# Patient Record
Sex: FEMALE | Race: WHITE | NOT HISPANIC OR LATINO
[De-identification: names, ages, dates, MRNs, and addresses within clinical notes are randomized per-mention and may not be internally consistent; named-entity substitution may affect disease eponyms.]

---

## 2018-02-15 ENCOUNTER — TRANSCRIPTION ENCOUNTER (OUTPATIENT)
Age: 63
End: 2018-02-15

## 2018-12-21 PROBLEM — Z00.00 ENCOUNTER FOR PREVENTIVE HEALTH EXAMINATION: Status: ACTIVE | Noted: 2018-12-21

## 2019-01-31 ENCOUNTER — RESULT REVIEW (OUTPATIENT)
Age: 64
End: 2019-01-31

## 2019-01-31 ENCOUNTER — APPOINTMENT (OUTPATIENT)
Dept: ENDOCRINOLOGY | Facility: CLINIC | Age: 64
End: 2019-01-31
Payer: COMMERCIAL

## 2019-01-31 VITALS
DIASTOLIC BLOOD PRESSURE: 82 MMHG | BODY MASS INDEX: 18 KG/M2 | HEIGHT: 66 IN | HEART RATE: 72 BPM | WEIGHT: 112 LBS | SYSTOLIC BLOOD PRESSURE: 145 MMHG

## 2019-01-31 PROCEDURE — 99203 OFFICE O/P NEW LOW 30 MIN: CPT | Mod: 25

## 2019-01-31 PROCEDURE — 36415 COLL VENOUS BLD VENIPUNCTURE: CPT

## 2019-02-10 LAB
25(OH)D3 SERPL-MCNC: 33.6 NG/ML
ALBUMIN SERPL ELPH-MCNC: 5 G/DL
ALP BLD-CCNC: 97 U/L
ALT SERPL-CCNC: 16 U/L
ANION GAP SERPL CALC-SCNC: 14 MMOL/L
AST SERPL-CCNC: 21 U/L
BASOPHILS # BLD AUTO: 0.02 K/UL
BASOPHILS NFR BLD AUTO: 0.4 %
BILIRUB DIRECT SERPL-MCNC: 0.1 MG/DL
BILIRUB INDIRECT SERPL-MCNC: 0.4 MG/DL
BILIRUB SERPL-MCNC: 0.5 MG/DL
BUN SERPL-MCNC: 10 MG/DL
CALCIUM SERPL-MCNC: 10.2 MG/DL
CALCIUM SERPL-MCNC: 10.2 MG/DL
CHLORIDE SERPL-SCNC: 95 MMOL/L
CO2 SERPL-SCNC: 30 MMOL/L
COLLAGEN CTX SERPL-MCNC: 513 PG/ML
CREAT SERPL-MCNC: 0.68 MG/DL
EOSINOPHIL # BLD AUTO: 0.11 K/UL
EOSINOPHIL NFR BLD AUTO: 2.1 %
GLIADIN IGA SER QL: <5 UNITS
GLIADIN IGG SER QL: <5 UNITS
GLIADIN PEPTIDE IGA SER-ACNC: NEGATIVE
GLIADIN PEPTIDE IGG SER-ACNC: NEGATIVE
GLUCOSE SERPL-MCNC: 88 MG/DL
HCT VFR BLD CALC: 42.3 %
HGB BLD-MCNC: 13.9 G/DL
IMM GRANULOCYTES NFR BLD AUTO: 0.2 %
LYMPHOCYTES # BLD AUTO: 1.14 K/UL
LYMPHOCYTES NFR BLD AUTO: 21.4 %
M PROTEIN SPEC IFE-MCNC: NORMAL
MAN DIFF?: NORMAL
MCHC RBC-ENTMCNC: 29.7 PG
MCHC RBC-ENTMCNC: 32.9 GM/DL
MCV RBC AUTO: 90.4 FL
MONOCYTES # BLD AUTO: 0.56 K/UL
MONOCYTES NFR BLD AUTO: 10.5 %
NEUTROPHILS # BLD AUTO: 3.48 K/UL
NEUTROPHILS NFR BLD AUTO: 65.4 %
OSTEOCALCIN SERPL-MCNC: 29 NG/ML
PARATHYROID HORMONE INTACT: 39 PG/ML
PHOSPHATE SERPL-MCNC: 3.5 MG/DL
PLATELET # BLD AUTO: 354 K/UL
POTASSIUM SERPL-SCNC: 4.2 MMOL/L
PROT SERPL-MCNC: 8.2 G/DL
RBC # BLD: 4.68 M/UL
RBC # FLD: 13.6 %
SODIUM SERPL-SCNC: 139 MMOL/L
T4 SERPL-MCNC: 10.1 UG/DL
TSH SERPL-ACNC: 1.05 UIU/ML
TTG IGA SER IA-ACNC: <5 UNITS
TTG IGA SER-ACNC: NEGATIVE
TTG IGG SER IA-ACNC: <5 UNITS
TTG IGG SER IA-ACNC: NEGATIVE
WBC # FLD AUTO: 5.32 K/UL

## 2019-02-27 ENCOUNTER — RECORD ABSTRACTING (OUTPATIENT)
Age: 64
End: 2019-02-27

## 2019-02-27 DIAGNOSIS — Z86.39 PERSONAL HISTORY OF OTHER ENDOCRINE, NUTRITIONAL AND METABOLIC DISEASE: ICD-10-CM

## 2019-02-27 DIAGNOSIS — Z86.19 PERSONAL HISTORY OF OTHER INFECTIOUS AND PARASITIC DISEASES: ICD-10-CM

## 2019-02-27 DIAGNOSIS — Z87.19 PERSONAL HISTORY OF OTHER DISEASES OF THE DIGESTIVE SYSTEM: ICD-10-CM

## 2019-02-27 LAB — CYTOLOGY CVX/VAG DOC THIN PREP: NORMAL

## 2019-02-27 RX ORDER — OMEGA-3/DHA/EPA/FISH OIL 300-1000MG
1000 CAPSULE ORAL
Refills: 0 | Status: ACTIVE | COMMUNITY

## 2019-03-22 ENCOUNTER — APPOINTMENT (OUTPATIENT)
Dept: ENDOCRINOLOGY | Facility: CLINIC | Age: 64
End: 2019-03-22
Payer: COMMERCIAL

## 2019-03-22 VITALS
HEIGHT: 66 IN | SYSTOLIC BLOOD PRESSURE: 130 MMHG | HEART RATE: 91 BPM | WEIGHT: 109 LBS | DIASTOLIC BLOOD PRESSURE: 80 MMHG | BODY MASS INDEX: 17.52 KG/M2

## 2019-03-22 PROCEDURE — 99213 OFFICE O/P EST LOW 20 MIN: CPT

## 2019-03-23 NOTE — HISTORY OF PRESENT ILLNESS
[FreeTextEntry1] : March 22, 2019\par \par PCP:  Dr. Kana Limon\par          Gyn:  Dr. Larry Mendelowitz\par          GI: Dr. Diogo Heredia  (polyps and FHx colon cancer)\par           Dentist:  Dr. Davila  \par \par CC:  Osteoporosis with compression fracture T7\par         (GERD)\par         Smoker\par \par 64 yo - no children - retired  from [employer].  \par Recent bone density showed decline. \par "If I overdo things, I note some back pain.   If you didn't tell me about the compression fracture, I wouldn't know"\par 5 ft 6" (previously 5 ft 7')     109 lbs Max weight 160.    She loses weight after episodes of the flu.  \par She has had upper endoscopy.  \par \par Impression:  Does not tolerate oral bisphosphonate.\par Needs an anabolic.\par \par Plan:  Tymlos will be requested.   \par \par \par \par \par October 15, 2019  bone density:\par \par Bone density measurements on the [employer]ologic QDR 4500 x-ray densitometer were as follows:\par \par \par \par Lumbar spine vertebrae 0.626 gm/cm2.\par \par T-score = -3.8\par \par Z-score = -2.3\par \par \par \par Hip 0.58 in gm/cm2.\par \par T-score = -3\par \par Z-score = -1.9\par \par (Femoral neck for assessment of FRAX index, 0.488 in gm/cm2.\par T-score = -3.2\par Z-score = -1.9 )\par \par \par IMPRESSION:  Osteoporotic bone mineral density of the hip and osteoporosis of the spine placing the patient at an overall high fracture risk  \par \par COMPARISON is made to a baseline at 2009 and most recent previous of 2015\par \par In the spine there are statistically significant decreases in bone mineral density of 29.6, and 10.7% respectively.\par \par In the hip, there are statistically significant decreases in bone mineral density of 30, and 8.3% respectively.\par \par \par \par X-ray of spine on 1/31/2019 revealed "overall demineralization.  A moderate compression fracture of the T7 vertebral body is noted...."  \par \par Lab tests revealed no metabolic cause for the low bone density, including 25 OH vit D of 33.6 \par \par \par \par \par \par

## 2019-03-23 NOTE — CONSULT LETTER
[FreeTextEntry2] : PCP:  Dr. Kana Limon\par          Gyn:  Dr. Larry Mendelowitz\par          GI: Dr. Diogo Heredia  (polyps and FHx colon cancer)\par           Dentist:  Dr. Davila  \par \par CC:  Osteoporosis with compression fracture T7\par         (GERD)\par         Smoker

## 2019-04-12 ENCOUNTER — APPOINTMENT (OUTPATIENT)
Dept: INTERNAL MEDICINE | Facility: CLINIC | Age: 64
End: 2019-04-12
Payer: COMMERCIAL

## 2019-04-12 VITALS
BODY MASS INDEX: 17.68 KG/M2 | WEIGHT: 110 LBS | DIASTOLIC BLOOD PRESSURE: 86 MMHG | HEIGHT: 66 IN | SYSTOLIC BLOOD PRESSURE: 160 MMHG

## 2019-04-12 DIAGNOSIS — Z78.9 OTHER SPECIFIED HEALTH STATUS: ICD-10-CM

## 2019-04-12 PROCEDURE — 99406 BEHAV CHNG SMOKING 3-10 MIN: CPT

## 2019-04-12 PROCEDURE — 99203 OFFICE O/P NEW LOW 30 MIN: CPT | Mod: 25

## 2019-04-12 PROCEDURE — 36415 COLL VENOUS BLD VENIPUNCTURE: CPT

## 2019-04-12 NOTE — REVIEW OF SYSTEMS
[Frequency] : frequency [Negative] : Neurological [Fever] : no fever [Dysuria] : no dysuria [Incontinence] : no incontinence [Hematuria] : no hematuria [de-identified] : + rash on breast

## 2019-04-12 NOTE — PHYSICAL EXAM
[Well Nourished] : well nourished [No Acute Distress] : no acute distress [Well Developed] : well developed [Well-Appearing] : well-appearing [No Respiratory Distress] : no respiratory distress  [No Accessory Muscle Use] : no accessory muscle use [Clear to Auscultation] : lungs were clear to auscultation bilaterally [Normal Rate] : normal rate  [Regular Rhythm] : with a regular rhythm [No Edema] : there was no peripheral edema [Normal S1, S2] : normal S1 and S2 [No Murmur] : no murmur heard [Normal Gait] : normal gait [Normal Affect] : the affect was normal [No Focal Deficits] : no focal deficits [Alert and Oriented x3] : oriented to person, place, and time [Normal Insight/Judgement] : insight and judgment were intact [de-identified] : mild slightly raised rash on skin

## 2019-04-12 NOTE — COUNSELING
[Discussed Cessation Medication] : cessation medication was discussed [Discussed Risks and Advised to Quit Smoking] : Discussed risks and advised to quit smoking [Discussed Cessation Strategies] : cessation strategies were discussed [Tobacco Use Cessation Intermediate Greater Than 3 Minutes Up to 10 Minutes] : Tobacco Use Cessation Intermediate Greater Than 3 Minutes Up to 10 Minutes

## 2019-04-12 NOTE — HISTORY OF PRESENT ILLNESS
[FreeTextEntry1] : lyme testing [de-identified] : Pt here for rash that is currently on her left breast. Not itchy, not painful. She gardens a lot. This rash was on her back 2 weeks ago and was also on another location on her back. She has no fever. No specific joint pains. Pt says that she was treated for Lyme in the past about 3 years ago. She had positive serologies then and her only presently symptom was a traveling rash. She has no specific fatigue. \par She has no seen any tick on her.\par She also complaints of urinary frequency as well. no pain on urination, no flank pain, no fever. She has had urinary frequency for 6 months. She drinks a lot of water.

## 2019-04-14 NOTE — ASSESSMENT
[FreeTextEntry1] : &\par Has risk factors for osteoporosis including smoking, low BMI.  Will check for additional contributing factors and then advise pharmacologic intervention.

## 2019-04-14 NOTE — PHYSICAL EXAM
[Alert] : alert [No Acute Distress] : no acute distress [Normal Sclera/Conjunctiva] : normal sclera/conjunctiva [Well Developed] : well developed [Well Nourished] : well nourished [EOMI] : extra ocular movement intact [No Proptosis] : no proptosis [Normal Oropharynx] : the oropharynx was normal [No Thyroid Nodules] : there were no palpable thyroid nodules [Thyroid Not Enlarged] : the thyroid was not enlarged [No Respiratory Distress] : no respiratory distress [No Accessory Muscle Use] : no accessory muscle use [Clear to Auscultation] : lungs were clear to auscultation bilaterally [Normal Rate] : heart rate was normal  [Pedal Pulses Normal] : the pedal pulses are present [Normal S1, S2] : normal S1 and S2 [Regular Rhythm] : with a regular rhythm [Not Tender] : non-tender [No Edema] : there was no peripheral edema [Normal Bowel Sounds] : normal bowel sounds [Post Cervical Nodes] : posterior cervical nodes [Not Distended] : not distended [Soft] : abdomen soft [Normal] : normal and non tender [Anterior Cervical Nodes] : anterior cervical nodes [Axillary Nodes] : axillary nodes [No Stigmata of Cushings Syndrome] : no stigmata of cushings syndrome [No Spinal Tenderness] : no spinal tenderness [Spine Straight] : spine straight [No Rash] : no rash [Normal Gait] : normal gait [Normal Strength/Tone] : muscle strength and tone were normal [Acanthosis Nigricans] : no acanthosis nigricans [Normal Reflexes] : deep tendon reflexes were 2+ and symmetric [Oriented x3] : oriented to person, place, and time [No Tremors] : no tremors

## 2019-04-14 NOTE — HISTORY OF PRESENT ILLNESS
[FreeTextEntry1] : January 31, 2019\par \par PCP:    Dr. Larry Mendelowitz\par            GI:  Dr. Heredia  \par \par CC:  Osteoporosis\par         (On omeprazole for Barretts)\par         (smoker)\par \par 64 yo retired  in [place].\par \par 5 ft 6 in 112 lbs \par \par \par Impression:  Has marked osteoporosis.   Low BMI and smoking contribute.\par Will check for other causes and then negotiate addition of pharmacoligc intervention.\par

## 2019-04-15 LAB — BACTERIA UR CULT: NORMAL

## 2019-04-16 LAB — B BURGDOR DNA SPEC QL NAA+PROBE: NEGATIVE

## 2019-04-25 ENCOUNTER — RX RENEWAL (OUTPATIENT)
Age: 64
End: 2019-04-25

## 2019-05-17 ENCOUNTER — APPOINTMENT (OUTPATIENT)
Dept: ENDOCRINOLOGY | Facility: CLINIC | Age: 64
End: 2019-05-17
Payer: COMMERCIAL

## 2019-05-17 VITALS
BODY MASS INDEX: 17.68 KG/M2 | HEIGHT: 66 IN | SYSTOLIC BLOOD PRESSURE: 132 MMHG | WEIGHT: 110 LBS | DIASTOLIC BLOOD PRESSURE: 80 MMHG | HEART RATE: 80 BPM

## 2019-05-17 PROCEDURE — 99214 OFFICE O/P EST MOD 30 MIN: CPT

## 2019-05-26 ENCOUNTER — LABORATORY RESULT (OUTPATIENT)
Age: 64
End: 2019-05-26

## 2019-05-27 NOTE — HISTORY OF PRESENT ILLNESS
[FreeTextEntry1] : May 17, 2019\par \par PCP:    Dr. Larry Mendelowitz\par            GI:  Dr. Heredia  \par \par CC:  Osteoporosis\par         (On omeprazole for Barretts)\par         (smoker)\par \par 5 ft 6 in 112 lbs at home.  Would like to weigh 125-130 lbs \par She likes chocolate, coffee ice cream, flying saucers.\par Has 2 - 3 BM/day which she attributes to the magnesium in her [employer] calcium citrate w/ vit D  "It would be perfect if there\par was vitamin K in it."   \par \par Today she was instructed in use of Tymlos and she did well.\par \par Plan:  Inject real Tymlos 80 mg HS.   Call me tomorrow to let me know how it goes.\par Go for updated labs a [facility] in two weeks.\par ROV here in August.  \par \par \par January 31, 2019\par \par PCP:    Dr. Larry Mendelowitz\par            GI:  Dr. Heredia  \par \par CC:  Osteoporosis\par         (On omeprazole for Barretts)\par         (smoker)\par \par 62 yo retired  in [place].\par \par 5 ft 6 in 112 lbs \par \par \par Impression:  Has marked osteoporosis.   Low BMI and smoking contribute.\par Will check for other causes and then negotiate addition of pharmacoligc intervention.\par

## 2019-06-10 ENCOUNTER — APPOINTMENT (OUTPATIENT)
Dept: ENDOCRINOLOGY | Facility: CLINIC | Age: 64
End: 2019-06-10
Payer: COMMERCIAL

## 2019-06-10 VITALS
WEIGHT: 112 LBS | BODY MASS INDEX: 18 KG/M2 | SYSTOLIC BLOOD PRESSURE: 120 MMHG | DIASTOLIC BLOOD PRESSURE: 78 MMHG | HEIGHT: 66 IN | HEART RATE: 83 BPM

## 2019-06-10 PROCEDURE — 36415 COLL VENOUS BLD VENIPUNCTURE: CPT

## 2019-06-10 PROCEDURE — 99213 OFFICE O/P EST LOW 20 MIN: CPT | Mod: 25

## 2019-06-13 LAB
ACTH SER-ACNC: 9.1 PG/ML
ALBUMIN SERPL ELPH-MCNC: 4.4 G/DL
ALP BLD-CCNC: 91 U/L
ALT SERPL-CCNC: 11 U/L
ANION GAP SERPL CALC-SCNC: 14 MMOL/L
AST SERPL-CCNC: 19 U/L
BASOPHILS # BLD AUTO: 0.06 K/UL
BASOPHILS NFR BLD AUTO: 1 %
BILIRUB DIRECT SERPL-MCNC: 0.1 MG/DL
BILIRUB INDIRECT SERPL-MCNC: 0.1 MG/DL
BILIRUB SERPL-MCNC: 0.2 MG/DL
BUN SERPL-MCNC: 12 MG/DL
CALCIUM SERPL-MCNC: 9.9 MG/DL
CHLORIDE SERPL-SCNC: 98 MMOL/L
CK SERPL-CCNC: 117 U/L
CO2 SERPL-SCNC: 28 MMOL/L
CORTIS SERPL-MCNC: 9.1 UG/DL
CREAT SERPL-MCNC: 0.79 MG/DL
EOSINOPHIL # BLD AUTO: 0.11 K/UL
EOSINOPHIL NFR BLD AUTO: 1.8 %
ERYTHROCYTE [SEDIMENTATION RATE] IN BLOOD BY WESTERGREN METHOD: 10 MM/HR
GLUCOSE SERPL-MCNC: 111 MG/DL
HCT VFR BLD CALC: 38.3 %
HGB BLD-MCNC: 12.4 G/DL
IMM GRANULOCYTES NFR BLD AUTO: 0.2 %
LYMPHOCYTES # BLD AUTO: 1.61 K/UL
LYMPHOCYTES NFR BLD AUTO: 25.8 %
MAGNESIUM SERPL-MCNC: 1.7 MG/DL
MAN DIFF?: NORMAL
MCHC RBC-ENTMCNC: 29.7 PG
MCHC RBC-ENTMCNC: 32.4 GM/DL
MCV RBC AUTO: 91.6 FL
MONOCYTES # BLD AUTO: 0.68 K/UL
MONOCYTES NFR BLD AUTO: 10.9 %
NEUTROPHILS # BLD AUTO: 3.78 K/UL
NEUTROPHILS NFR BLD AUTO: 60.3 %
OSTEOCALCIN SERPL-MCNC: 79 NG/ML
PHOSPHATE SERPL-MCNC: 3.6 MG/DL
PLATELET # BLD AUTO: 317 K/UL
POTASSIUM SERPL-SCNC: 4.1 MMOL/L
PROT SERPL-MCNC: 7.4 G/DL
RBC # BLD: 4.18 M/UL
RBC # FLD: 13.3 %
SODIUM SERPL-SCNC: 140 MMOL/L
WBC # FLD AUTO: 6.25 K/UL

## 2019-06-14 NOTE — HISTORY OF PRESENT ILLNESS
[FreeTextEntry1] : Gillian 10, 2019\par \par PCP:    Dr. Larry Mendelowitz\par            GI:  Dr. Heredia  \par \par CC:  Osteoporosis      10/15/18  spine T -3.8, Z -2.3; TH T -3, Z -19;  FN T -3.2, Z -1.9\par                                     1/13/19 X-ray:  moderate compression fracture of T7\par         (On omeprazole for Barretts)\par         (smoker)\par \par Took Tymlos 5/17 and stopped after 2 weeks because of extreme fatigue, nausea, heart burn, pain in shoulders, elbows, wrists,\par So I advised her to stop it for now and she feels betters.   "The only thing I didn't have was the lightheadedness and the dizziness"\par \par Plan:  As she still notes bone pain, will not restart Tymlos or offer any alternative, although only Forteo is likely to have similar benefit.   \par \par \par May 17, 2019\par \par PCP:    Dr. Larry Mendelowitz\par            GI:  Dr. Heredia  \par \par CC:  Osteoporosis\par         (On omeprazole for Barretts)\par         (smoker)\par \par 5 ft 6 in 112 lbs at home.  Would like to weigh 125-130 lbs \par She likes chocolate, coffee ice cream, flying saucers.\par Has 2 - 3 BM/day which she attributes to the magnesium in her [employer] calcium citrate w/ vit D  "It would be perfect if there\par was vitamin K in it."   \par \par Today she was instructed in use of Tymlos and she did well.\par \par Plan:  Inject real Tymlos 80 mg HS.   Call me tomorrow to let me know how it goes.\par Go for updated labs a [facility] in two weeks.\par ROV here in August.  \par \par \par January 31, 2019\par \par PCP:    Dr. Larry Mendelowitz\par            GI:  Dr. Heredia  \par \par CC:  Osteoporosis\par         (On omeprazole for Barretts)\par         (smoker)\par \par 62 yo retired  in [place].\par \par 5 ft 6 in 112 lbs \par \par \par Impression:  Has marked osteoporosis.   Low BMI and smoking contribute.\par Will check for other causes and then negotiate addition of pharmacoligc intervention.\par

## 2019-06-14 NOTE — ASSESSMENT
[FreeTextEntry1] : &\par She is very anxious about use of Tymlos to treat her osteoporosis and has developed symptoms which are very distracting, including decreased spine density.

## 2019-07-16 ENCOUNTER — TRANSCRIPTION ENCOUNTER (OUTPATIENT)
Age: 64
End: 2019-07-16

## 2019-08-01 ENCOUNTER — APPOINTMENT (OUTPATIENT)
Dept: ENDOCRINOLOGY | Facility: CLINIC | Age: 64
End: 2019-08-01
Payer: COMMERCIAL

## 2019-08-01 VITALS
HEART RATE: 82 BPM | SYSTOLIC BLOOD PRESSURE: 110 MMHG | DIASTOLIC BLOOD PRESSURE: 78 MMHG | BODY MASS INDEX: 17.84 KG/M2 | HEIGHT: 66 IN | WEIGHT: 111 LBS

## 2019-08-01 PROCEDURE — 99214 OFFICE O/P EST MOD 30 MIN: CPT

## 2019-08-01 RX ORDER — ABALOPARATIDE 2000 UG/ML
3120 INJECTION, SOLUTION SUBCUTANEOUS DAILY
Qty: 1 | Refills: 11 | Status: DISCONTINUED | COMMUNITY
Start: 2019-03-22 | End: 2019-08-01

## 2019-08-01 NOTE — HISTORY OF PRESENT ILLNESS
[FreeTextEntry1] : August 1, 2019\par PCP:    Dr. Larry Mendelowitz\par            GI:  Dr. Heredia  \par \par CC:  Osteoporosis      10/15/18  spine T -3.8, Z -2.3; TH T -3, Z -19;  FN T -3.2, Z -1.9\par                                     1/13/19 X-ray:  moderate compression fracture of T7\par                                                  CTXs  513 1/2019\par         (On omeprazole for Barretts)\par         (smoker)\par \par 64 yo with severe GERD, Nance's esophagus, can't tolerate oral antiresorptive such as Fosamax, Actonel.\par She has severe osteoporosis (spine T -3.8, femoral neck T -3.2 in October 2018) and was given\par anabolic agent:  Tymlos, but did not tolerate it.  \par Next appropriate step would be Prolia as order does matter.  \par Had to stop Tymlos because of symptoms noted during previous visit.\par It took about 3 weeks for her symptoms went away.\par \par Impression:  Severe osteoporosis with compression fracture of T7.\par She did not tolerate an oral bisphosphonate.\par Prolia will be next on the agenda   \par \par \par \par \par \par Gillian 10, 2019\par \par PCP:    Dr. Larry Mendelowitz\par            GI:  Dr. Heredia  \par \par CC:  Osteoporosis      10/15/18  spine T -3.8, Z -2.3; TH T -3, Z -19;  FN T -3.2, Z -1.9\par                                     1/13/19 X-ray:  moderate compression fracture of T7\par         (On omeprazole for Barretts)\par         (smoker)\par \par Took Tymlos 5/17 and stopped after 2 weeks because of extreme fatigue, nausea, heart burn, pain in shoulders, elbows, wrists,\par So I advised her to stop it for now and she feels betters.   "The only thing I didn't have was the lightheadedness and the dizziness"\par \par Plan:  As she still notes bone pain, will not restart Tymlos or offer any alternative, although only Forteo is likely to have similar benefit.   \par \par \par May 17, 2019\par \par PCP:    Dr. Larry Mendelowitz\par            GI:  Dr. Heredia  \par \par CC:  Osteoporosis\par         (On omeprazole for Barretts)\par         (smoker)\par \par 5 ft 6 in 112 lbs at home.  Would like to weigh 125-130 lbs \par She likes chocolate, coffee ice cream, flying saucers.\par Has 2 - 3 BM/day which she attributes to the magnesium in her [employer] calcium citrate w/ vit D  "It would be perfect if there\par was vitamin K in it."   \par \par Today she was instructed in use of Tymlos and she did well.\par \par Plan:  Inject real Tymlos 80 mg HS.   Call me tomorrow to let me know how it goes.\par Go for updated labs a [facility] in two weeks.\par ROV here in August.  \par \par \par January 31, 2019\par \par PCP:    Dr. Larry Mendelowitz\par            GI:  Dr. Heredia  \par \par CC:  Osteoporosis\par         (On omeprazole for Barretts)\par         (smoker)\par \par 64 yo retired  in [place].\par \par 5 ft 6 in 112 lbs \par \par \par Impression:  Has marked osteoporosis.   Low BMI and smoking contribute.\par Will check for other causes and then negotiate addition of pharmacoligc intervention.\par

## 2019-08-01 NOTE — PHYSICAL EXAM
[Alert] : alert [No Acute Distress] : no acute distress [Well Nourished] : well nourished [Well Developed] : well developed [Thyroid Not Enlarged] : the thyroid was not enlarged [No Stigmata of Cushings Syndrome] : no stigmata of cushings syndrome [Oriented x3] : oriented to person, place, and time [Normal Insight/Judgement] : insight and judgment were intact [Normal Affect] : the affect was normal [Normal Mood] : the mood was normal

## 2019-08-08 ENCOUNTER — RX CHANGE (OUTPATIENT)
Age: 64
End: 2019-08-08

## 2019-08-28 ENCOUNTER — CHART COPY (OUTPATIENT)
Age: 64
End: 2019-08-28

## 2019-09-17 ENCOUNTER — APPOINTMENT (OUTPATIENT)
Dept: OBGYN | Facility: CLINIC | Age: 64
End: 2019-09-17
Payer: COMMERCIAL

## 2019-09-17 VITALS
WEIGHT: 114 LBS | HEIGHT: 66 IN | DIASTOLIC BLOOD PRESSURE: 84 MMHG | BODY MASS INDEX: 18.32 KG/M2 | SYSTOLIC BLOOD PRESSURE: 120 MMHG

## 2019-09-17 DIAGNOSIS — Z80.0 FAMILY HISTORY OF MALIGNANT NEOPLASM OF DIGESTIVE ORGANS: ICD-10-CM

## 2019-09-17 PROCEDURE — 99396 PREV VISIT EST AGE 40-64: CPT

## 2019-09-17 NOTE — PHYSICAL EXAM
[Awake] : awake [Alert] : alert [Soft] : soft [Oriented x3] : oriented to person, place, and time [Vulvar Atrophy] : vulvar atrophy [Normal] : uterus [Atrophy] : atrophy [No Bleeding] : there was no active vaginal bleeding [Uterine Adnexae] : were not tender and not enlarged [Nl Sphincter Tone] : normal sphincter tone [Acute Distress] : no acute distress [Mass] : no breast mass [Thyroid Nodule] : no thyroid nodule [Nipple Discharge] : no nipple discharge [Axillary LAD] : no axillary lymphadenopathy [Tender] : non tender [Enlarged ___ wks] : not enlarged [Ovarian Mass (___ Cm)] : there were no adnexal masses [FreeTextEntry4] : <2 FB introitus [FreeTextEntry7] : axial [FreeTextEntry9] : no masses

## 2019-10-16 ENCOUNTER — RESULT REVIEW (OUTPATIENT)
Age: 64
End: 2019-10-16

## 2019-10-30 ENCOUNTER — RESULT REVIEW (OUTPATIENT)
Age: 64
End: 2019-10-30

## 2019-11-13 ENCOUNTER — APPOINTMENT (OUTPATIENT)
Dept: ENDOCRINOLOGY | Facility: CLINIC | Age: 64
End: 2019-11-13
Payer: COMMERCIAL

## 2019-11-13 VITALS
BODY MASS INDEX: 17.68 KG/M2 | DIASTOLIC BLOOD PRESSURE: 90 MMHG | SYSTOLIC BLOOD PRESSURE: 120 MMHG | HEIGHT: 66 IN | HEART RATE: 68 BPM | WEIGHT: 110 LBS

## 2019-11-13 PROCEDURE — 36415 COLL VENOUS BLD VENIPUNCTURE: CPT

## 2019-11-13 PROCEDURE — 99214 OFFICE O/P EST MOD 30 MIN: CPT | Mod: 25

## 2019-11-13 NOTE — ASSESSMENT
[FreeTextEntry1] : &\mohinder Still smokes about 11 cigarettes a day.\par She is working on cutting back and understands effect of smoking on bone density.\mohinder Did not tolerate Tymlos but Prolia #1 on September 11, 2019 went well.  \par Will check vitamin D level today\par She will discuss issues of libido with gynecologist.

## 2019-11-13 NOTE — HISTORY OF PRESENT ILLNESS
[FreeTextEntry1] : Nov 13, 2019\par \par PCP:    Dr. Kana Limon\par            Dr. Larry Mendelowitz\par            GI:  Dr. Heredia  \par \par CC:  Osteoporosis      10/15/18  spine T -3.8, Z -2.3; TH T -3, Z -19;  FN T -3.2, Z -1.9\par                                     1/13/19 X-ray: \par                                     Compression fracture after fall off of a ladder about 10 years ago. \par           Hx ? Lyme\par           Fatigue\par           Decreased libido\par \par 62 yo , retired , with moderate compression fracture of T7\par                                                  CTXs  513 1/2019\par                                                  9/11/19  Prolia #1\par         (On omeprazole for Barretts)\par         (smoker)   down to 11 cigs/d    orig 30 cigs/d\par \par \par Frontal and lateral projections of thoracic spine demonstrate satisfactory alignment of the bony structures. There is overall demineralization. A moderate compression fracture of the T7 vertebral body is noted. No other compression deformity is noted. There is no evidence of pedicle destruction.\par \par \par IMPRESSION: Moderate compression fracture of T7.\par \par \par ***Electronically Signed ***\par -----------------------------------------------\par Gray Coko MD              01/31/19 \par \par \par Her  was diagnosed with prostate cancer about 6 years ago and is on treatment.  \par She wishes to have her testosterone level checked. \par \par Impression:  Has osteoporosis of spine, presumably traumatic compression fracture from\par fall off of ladder, risk factors of smoking, medications, did not tolerate Tymlos, might be a\par candidate for Evenity in the future; however, tolerating Prolia at the present time, received first Prolia September 11, 2019 and tolerated it well.   Next Prolia on or after March 11, 2020 so she will return here shortly before that.   \par \par August 1, 2019\par PCP:    Dr. Larry Mendelowitz\par            GI:  Dr. Heredia  \par \par CC:  Osteoporosis      10/15/18  spine T -3.8, Z -2.3; TH T -3, Z -19;  FN T -3.2, Z -1.9\par                                     1/13/19 X-ray:  moderate compression fracture of T7\par                                                  CTXs  513 1/2019\par         (On omeprazole for Barretts)\par         (smoker)\par \par 62 yo with severe GERD, Nance's esophagus, can't tolerate oral antiresorptive such as Fosamax, Actonel.\par She has severe osteoporosis (spine T -3.8, femoral neck T -3.2 in October 2018) and was given\par anabolic agent:  Tymlos, but did not tolerate it.  \par Next appropriate step would be Prolia as order does matter.  \par Had to stop Tymlos because of symptoms noted during previous visit.\par It took about 3 weeks for her symptoms went away.\par \par Impression:  Severe osteoporosis with compression fracture of T7.\par She did not tolerate an oral bisphosphonate.\par Prolia will be next on the agenda   \par \par \par \par \par \par Gillian 10, 2019\par \par PCP:    Dr. Larry Mendelowitz\par            GI:  Dr. Heredia  \par \par CC:  Osteoporosis      10/15/18  spine T -3.8, Z -2.3; TH T -3, Z -19;  FN T -3.2, Z -1.9\par                                     1/13/19 X-ray:  moderate compression fracture of T7\par         (On omeprazole for Barretts)\par         (smoker)\par \par Took Tymlos 5/17 and stopped after 2 weeks because of extreme fatigue, nausea, heart burn, pain in shoulders, elbows, wrists,\par So I advised her to stop it for now and she feels betters.   "The only thing I didn't have was the lightheadedness and the dizziness"\par \par Plan:  As she still notes bone pain, will not restart Tymlos or offer any alternative, although only Forteo is likely to have similar benefit.   \par \par \par May 17, 2019\par \par PCP:    Dr. Larry Mendelowitz\par            GI:  Dr. Heredia  \par \par CC:  Osteoporosis\par         (On omeprazole for Barretts)\par         (smoker)\par \par 5 ft 6 in 112 lbs at home.  Would like to weigh 125-130 lbs \par She likes chocolate, coffee ice cream, flying saucers.\par Has 2 - 3 BM/day which she attributes to the magnesium in her [employer] calcium citrate w/ vit D  "It would be perfect if there\par was vitamin K in it."   \par \par Today she was instructed in use of Tymlos and she did well.\par \par Plan:  Inject real Tymlos 80 mg HS.   Call me tomorrow to let me know how it goes.\par Go for updated labs a [facility] in two weeks.\par ROV here in August.  \par \par \par January 31, 2019\par \par PCP:    Dr. Larry Mendelowitz\par            GI:  Dr. Heredia  \par \par CC:  Osteoporosis\par         (On omeprazole for Barretts)\par         (smoker)\par \par 62 yo retired  in [place].\par \par 5 ft 6 in 112 lbs \par \par \par Impression:  Has marked osteoporosis.   Low BMI and smoking contribute.\par Will check for other causes and then negotiate addition of pharmacoligc intervention.\par

## 2019-11-13 NOTE — PHYSICAL EXAM
[Alert] : alert [No Acute Distress] : no acute distress [Well Nourished] : well nourished [Well Developed] : well developed [Normal Sclera/Conjunctiva] : normal sclera/conjunctiva [EOMI] : extra ocular movement intact [No Proptosis] : no proptosis [Normal Oropharynx] : the oropharynx was normal [Thyroid Not Enlarged] : the thyroid was not enlarged [No Respiratory Distress] : no respiratory distress [No Thyroid Nodules] : there were no palpable thyroid nodules [Clear to Auscultation] : lungs were clear to auscultation bilaterally [No Accessory Muscle Use] : no accessory muscle use [Normal S1, S2] : normal S1 and S2 [Normal Rate] : heart rate was normal  [Pedal Pulses Normal] : the pedal pulses are present [Regular Rhythm] : with a regular rhythm [No Edema] : there was no peripheral edema [Not Tender] : non-tender [Normal Bowel Sounds] : normal bowel sounds [Not Distended] : not distended [Soft] : abdomen soft [Axillary Nodes] : axillary nodes [Post Cervical Nodes] : posterior cervical nodes [Anterior Cervical Nodes] : anterior cervical nodes [Normal] : normal and non tender [No Spinal Tenderness] : no spinal tenderness [Spine Straight] : spine straight [Normal Gait] : normal gait [No Stigmata of Cushings Syndrome] : no stigmata of cushings syndrome [Normal Strength/Tone] : muscle strength and tone were normal [No Rash] : no rash [Acanthosis Nigricans] : no acanthosis nigricans [Oriented x3] : oriented to person, place, and time [No Tremors] : no tremors [Normal Reflexes] : deep tendon reflexes were 2+ and symmetric

## 2019-11-14 LAB
25(OH)D3 SERPL-MCNC: 38.9 NG/ML
ALBUMIN SERPL ELPH-MCNC: 4.7 G/DL
ALP BLD-CCNC: 74 U/L
ALT SERPL-CCNC: 10 U/L
ANION GAP SERPL CALC-SCNC: 13 MMOL/L
AST SERPL-CCNC: 18 U/L
BASOPHILS # BLD AUTO: 0.05 K/UL
BASOPHILS NFR BLD AUTO: 0.8 %
BILIRUB DIRECT SERPL-MCNC: 0.1 MG/DL
BILIRUB INDIRECT SERPL-MCNC: 0.1 MG/DL
BILIRUB SERPL-MCNC: 0.2 MG/DL
BUN SERPL-MCNC: 12 MG/DL
CALCIUM SERPL-MCNC: 10.1 MG/DL
CHLORIDE SERPL-SCNC: 95 MMOL/L
CO2 SERPL-SCNC: 28 MMOL/L
CORTIS SERPL-MCNC: 10.5 UG/DL
CREAT SERPL-MCNC: 0.77 MG/DL
EOSINOPHIL # BLD AUTO: 0.1 K/UL
EOSINOPHIL NFR BLD AUTO: 1.7 %
ERYTHROCYTE [SEDIMENTATION RATE] IN BLOOD BY WESTERGREN METHOD: 25 MM/HR
ESTIMATED AVERAGE GLUCOSE: 114 MG/DL
GLUCOSE SERPL-MCNC: 89 MG/DL
HBA1C MFR BLD HPLC: 5.6 %
HCT VFR BLD CALC: 39.9 %
HGB BLD-MCNC: 13.2 G/DL
IMM GRANULOCYTES NFR BLD AUTO: 0.2 %
LYMPHOCYTES # BLD AUTO: 1.37 K/UL
LYMPHOCYTES NFR BLD AUTO: 23 %
MAN DIFF?: NORMAL
MCHC RBC-ENTMCNC: 29.8 PG
MCHC RBC-ENTMCNC: 33.1 GM/DL
MCV RBC AUTO: 90.1 FL
MONOCYTES # BLD AUTO: 0.68 K/UL
MONOCYTES NFR BLD AUTO: 11.4 %
NEUTROPHILS # BLD AUTO: 3.75 K/UL
NEUTROPHILS NFR BLD AUTO: 62.9 %
PLATELET # BLD AUTO: 314 K/UL
POTASSIUM SERPL-SCNC: 4.2 MMOL/L
PROT SERPL-MCNC: 7.9 G/DL
RBC # BLD: 4.43 M/UL
RBC # FLD: 13.2 %
SODIUM SERPL-SCNC: 136 MMOL/L
VIT B12 SERPL-MCNC: 423 PG/ML
WBC # FLD AUTO: 5.96 K/UL

## 2019-11-17 LAB
B BURGDOR AB SER-IMP: NEGATIVE
B BURGDOR IGM PATRN SER IB-IMP: NEGATIVE
B BURGDOR18KD IGG SER QL IB: NORMAL
B BURGDOR23KD IGG SER QL IB: NORMAL
B BURGDOR23KD IGM SER QL IB: NORMAL
B BURGDOR28KD IGG SER QL IB: NORMAL
B BURGDOR30KD IGG SER QL IB: NORMAL
B BURGDOR31KD IGG SER QL IB: PRESENT
B BURGDOR39KD IGG SER QL IB: NORMAL
B BURGDOR39KD IGM SER QL IB: NORMAL
B BURGDOR41KD IGG SER QL IB: PRESENT
B BURGDOR41KD IGM SER QL IB: NORMAL
B BURGDOR45KD IGG SER QL IB: PRESENT
B BURGDOR58KD IGG SER QL IB: NORMAL
B BURGDOR66KD IGG SER QL IB: NORMAL
B BURGDOR93KD IGG SER QL IB: NORMAL
VIT B6 SERPL-MCNC: 4.5 UG/L

## 2020-02-27 ENCOUNTER — APPOINTMENT (OUTPATIENT)
Dept: ENDOCRINOLOGY | Facility: CLINIC | Age: 65
End: 2020-02-27
Payer: COMMERCIAL

## 2020-02-27 VITALS
OXYGEN SATURATION: 97 % | BODY MASS INDEX: 17.84 KG/M2 | HEIGHT: 66 IN | DIASTOLIC BLOOD PRESSURE: 82 MMHG | WEIGHT: 111 LBS | SYSTOLIC BLOOD PRESSURE: 120 MMHG | HEART RATE: 72 BPM

## 2020-02-27 PROCEDURE — 99214 OFFICE O/P EST MOD 30 MIN: CPT

## 2020-03-05 NOTE — HISTORY OF PRESENT ILLNESS
[FreeTextEntry1] : Feb 27, 2020\par PCP:    Dr. Kana Limon\par            Dr. Larry Mendelowitz\par            GI:  Dr. Heredia  \par \par CC:  Osteoporosis      10/15/18  spine T -3.8, Z -2.3; TH T -3, Z -19;  FN T -3.2, Z -1.9\par                                     1/13/19 X-ray: \par                                     Compression fracture after fall off of a ladder about 10 years ago. \par           Hx ? Lyme\par           Fatigue\par           Decreased libido\par \par Received Prolia #1 on 9/11/2019 and reports that a month later she had diffuse bone pain, sandpaper rash on right palm, running nose, severe exhaustion, racing heart.   \par She does not wish to take any more Prolia and wants to know when it will be out of her system.   Still has racing heart and bone pain.   \par \par Impression:   Has symptomatic reflux and did not tolerate oral bisphosphonates.\par She did not tolerate the anabolic Tymlos and does not tolerate the Prolia either.\par She can be considered for Evenity as she has severe osteoporosis with comrpession fractures.\par \par Copay ID  RAQUEL [id number]\par \par \par \par \par \par Nov 13, 2019\par \par PCP:    Dr. Kana Limon\par            Dr. Larry Mendelowitz\par            GI:  Dr. Heredia  \par \par CC:  Osteoporosis      10/15/18  spine T -3.8, Z -2.3; TH T -3, Z -19;  FN T -3.2, Z -1.9\par                                     1/13/19 X-ray: \par                                     Compression fracture after fall off of a ladder about 10 years ago. \par           Hx ? Lyme\par           Fatigue\par           Decreased libido\par \par 62 yo , retired , with moderate compression fracture of T7\par                                                  CTXs  513 1/2019\par                                                  9/11/19  Prolia #1\par         (On omeprazole for Barretts)\par         (smoker)   down to 11 cigs/d    orig 30 cigs/d\par \par \par Frontal and lateral projections of thoracic spine demonstrate satisfactory alignment of the bony structures. There is overall demineralization. A moderate compression fracture of the T7 vertebral body is noted. No other compression deformity is noted. There is no evidence of pedicle destruction.\par \par \par IMPRESSION: Moderate compression fracture of T7.\par \par \par ***Electronically Signed ***\par -----------------------------------------------\par Gray Cook MD              01/31/19 \par \par \par Her  was diagnosed with prostate cancer about 6 years ago and is on treatment.  \par She wishes to have her testosterone level checked. \par \par Impression:  Has osteoporosis of spine, presumably traumatic compression fracture from\par fall off of ladder, risk factors of smoking, medications, did not tolerate Tymlos, might be a\par candidate for Evenity in the future; however, tolerating Prolia at the present time, received first Prolia September 11, 2019 and tolerated it well.   Next Prolia on or after March 11, 2020 so she will return here shortly before that.   \par \par August 1, 2019\par PCP:    Dr. Larry Mendelowitz\par            GI:  Dr. Heredia  \par \par CC:  Osteoporosis      10/15/18  spine T -3.8, Z -2.3; TH T -3, Z -19;  FN T -3.2, Z -1.9\par                                     1/13/19 X-ray:  moderate compression fracture of T7\par                                                  CTXs  513 1/2019\par         (On omeprazole for Barretts)\par         (smoker)\par \par 62 yo with severe GERD, Nance's esophagus, can't tolerate oral antiresorptive such as Fosamax, Actonel.\par She has severe osteoporosis (spine T -3.8, femoral neck T -3.2 in October 2018) and was given\par anabolic agent:  Tymlos, but did not tolerate it.  \par Next appropriate step would be Prolia as order does matter.  \par Had to stop Tymlos because of symptoms noted during previous visit.\par It took about 3 weeks for her symptoms went away.\par \par Impression:  Severe osteoporosis with compression fracture of T7.\par She did not tolerate an oral bisphosphonate.\par Prolia will be next on the agenda   \par \par \par \par \par \par Gillian 10, 2019\par \par PCP:    Dr. Larry Mendelowitz\par            GI:  Dr. Heredia  \par \par CC:  Osteoporosis      10/15/18  spine T -3.8, Z -2.3; TH T -3, Z -19;  FN T -3.2, Z -1.9\par                                     1/13/19 X-ray:  moderate compression fracture of T7\par         (On omeprazole for Barretts)\par         (smoker)\par \par Took Tymlos 5/17 and stopped after 2 weeks because of extreme fatigue, nausea, heart burn, pain in shoulders, elbows, wrists,\par So I advised her to stop it for now and she feels betters.   "The only thing I didn't have was the lightheadedness and the dizziness"\par \par Plan:  As she still notes bone pain, will not restart Tymlos or offer any alternative, although only Forteo is likely to have similar benefit.   \par \par \par May 17, 2019\par \par PCP:    Dr. Larry Mendelowitz\par            GI:  Dr. Heredia  \par \par CC:  Osteoporosis\par         (On omeprazole for Barretts)\par         (smoker)\par \par 5 ft 6 in 112 lbs at home.  Would like to weigh 125-130 lbs \par She likes chocolate, coffee ice cream, flying saucers.\par Has 2 - 3 BM/day which she attributes to the magnesium in her [employer] calcium citrate w/ vit D  "It would be perfect if there\par was vitamin K in it."   \par \par Today she was instructed in use of Tymlos and she did well.\par \par Plan:  Inject real Tymlos 80 mg HS.   Call me tomorrow to let me know how it goes.\par Go for updated labs a [facility] in two weeks.\par ROV here in August.  \par \par \par January 31, 2019\par \par PCP:    Dr. Larry Mendelowitz\par            GI:  Dr. Heredia  \par \par CC:  Osteoporosis\par         (On omeprazole for Barretts)\par         (smoker)\par \par 62 yo retired  in [place].\par \par 5 ft 6 in 112 lbs \par \par \par Impression:  Has marked osteoporosis.   Low BMI and smoking contribute.\par Will check for other causes and then negotiate addition of pharmacoligc intervention.\par

## 2020-03-05 NOTE — ASSESSMENT
[FreeTextEntry1] : Moderate compression fracture of T7 and spine T score -3.8\par Has not tolerated oral bisphosphonate, \par Appears she may have had reaction to Prolia.\par So she is appropriate candidate for Evenity once she receives insurance authorization.\par

## 2020-04-02 ENCOUNTER — APPOINTMENT (OUTPATIENT)
Dept: ENDOCRINOLOGY | Facility: CLINIC | Age: 65
End: 2020-04-02

## 2020-04-02 ENCOUNTER — APPOINTMENT (OUTPATIENT)
Dept: ENDOCRINOLOGY | Facility: CLINIC | Age: 65
End: 2020-04-02
Payer: COMMERCIAL

## 2020-04-02 PROCEDURE — 99443: CPT

## 2020-04-02 NOTE — HISTORY OF PRESENT ILLNESS
[FreeTextEntry1] : Apr 02, 2020\par \par This is a 21+ minute Tele-Phonic encounter with an established patient which was initiated by the patient during a time scheduled for a visit and the patient is aware that this may be a billable encounter.  The patient has not seen a provider of my specialty (Endocrinology) within out group in the past 7 days and this encounter is not anticipated to result in a scheduled in-person visit within he next 7 days.\par The reason for this Tele-Phonic encounter is listed below under "CC:"\par Verbal consent was discussed and obtained from the patient for this visit:  "You have chosen to receive care through the use of tele-media or telephone.   This enables health care providers at different locations to provide safe, effective, and convenient care through the use of technology.  Please note this is a billable encounter.  As with any health care service, there are risks associated with the use of tele-media or telephone, including equipment failure, poor image and/or resolution, and  issues.  You understand that I cannot physically examine you and that you may need to come to the office to complete the assessment.\par \par Patient agreed verbally to understanding the risks, benefits, alternatives of Tele-Media and telephone as explained.  All questions regarding tele-media and telephone encounters were answered. \par \par PCP:    Dr. Kana Limon\par            Dr. Larry Mendelowitz\par            GI:  Dr. Heredia  \par \par CC:  Osteoporosis      10/15/18  spine T -3.8, Z -2.3; TH T -3, Z -19;  FN T -3.2, Z -1.9\par                                     1/13/19 X-ray: \par                                     Compression fracture after fall off of a ladder about 10 years ago. T7\par                                      (smoker)\par           Hx ? Lyme\par           Fatigue\par           Decreased libido\par \par Did not tolerate oral bisphosphonate, Prolia. \par Had anticipated starting on Evenity; however, reluctant to leave home or to [facility]. \par She helps to care for her  who has cancer.   His PSA has risen.  \par \par She is taking calcium citrate HS w/ D     Also gets D from [facility] \par \par Impression:  Agree that it is prudent to avoid societal contacts as much as possible.\par Aim for f/u visit September and another shot at [facility].\par Avoid cigarettes.  \par \par \par Feb 27, 2020\par PCP:    Dr. Kana Limon\par            Dr. Larry Mendelowitz\par            GI:  Dr. Heredia  \par \par CC:  Osteoporosis      10/15/18  spine T -3.8, Z -2.3; TH T -3, Z -19;  FN T -3.2, Z -1.9\par                                     1/13/19 X-ray: \par                                     Compression fracture after fall off of a ladder about 10 years ago. \par           Hx ? Lyme\par           Fatigue\par           Decreased libido\par \par Received Prolia #1 on 9/11/2019 and reports that a month later she had diffuse bone pain, sandpaper rash on right palm, running nose, severe exhaustion, racing heart.   \par She does not wish to take any more Prolia and wants to know when it will be out of her system.   Still has racing heart and bone pain.   \par \par Impression:   Has symptomatic reflux and did not tolerate oral bisphosphonates.\par She did not tolerate the anabolic Tymlos and does not tolerate the Prolia either.\par She can be considered for [facility] as she has severe osteoporosis with comrpression fractures.\par \par Copay ID  RAQUEL [id number]\par \par \par \par \par \par Nov 13, 2019\par \par PCP:    Dr. Kana Limon\par            Dr. Larry Mendelowitz\par            GI:  Dr. Heredia  \par \par CC:  Osteoporosis      10/15/18  spine T -3.8, Z -2.3; TH T -3, Z -19;  FN T -3.2, Z -1.9\par                                     1/13/19 X-ray: \par                                     Compression fracture after fall off of a ladder about 10 years ago. \par           Hx ? Lyme\par           Fatigue\par           Decreased libido\par \par 64 yo , retired , with moderate compression fracture of T7\par                                                  CTXs  513 1/2019\par                                                  9/11/19  Prolia #1\par         (On omeprazole for Barretts)\par         (smoker)   down to 11 cigs/d    orig 30 cigs/d\par \par \par Frontal and lateral projections of thoracic spine demonstrate satisfactory alignment of the bony structures. There is overall demineralization. A moderate compression fracture of the T7 vertebral body is noted. No other compression deformity is noted. There is no evidence of pedicle destruction.\par \par \par IMPRESSION: Moderate compression fracture of T7.\par \par \par ***Electronically Signed ***\par -----------------------------------------------\par Gray Cook MD              01/31/19 \par \par \par Her  was diagnosed with prostate cancer about 6 years ago and is on treatment.  \par She wishes to have her testosterone level checked. \par \par Impression:  Has osteoporosis of spine, presumably traumatic compression fracture from\par fall off of ladder, risk factors of smoking, medications, did not tolerate Tymlos, might be a\par candidate for Evenity in the future; however, tolerating Prolia at the present time, received first Prolia September 11, 2019 and tolerated it well.   Next Prolia on or after March 11, 2020 so she will return here shortly before that.   \par \par August 1, 2019\par PCP:    Dr. Larry Mendelowitz\par            GI:  Dr. Heredia  \par \par CC:  Osteoporosis      10/15/18  spine T -3.8, Z -2.3; TH T -3, Z -19;  FN T -3.2, Z -1.9\par                                     1/13/19 X-ray:  moderate compression fracture of T7\par                                                  CTXs  513 1/2019\par         (On omeprazole for Barretts)\par         (smoker)\par \par 64 yo with severe GERD, Nance's esophagus, can't tolerate oral antiresorptive such as Fosamax, Actonel.\par She has severe osteoporosis (spine T -3.8, femoral neck T -3.2 in October 2018) and was given\par anabolic agent:  Tymlos, but did not tolerate it.  \par Next appropriate step would be Prolia as order does matter.  \par Had to stop Tymlos because of symptoms noted during previous visit.\par It took about 3 weeks for her symptoms went away.\par \par Impression:  Severe osteoporosis with compression fracture of T7.\par She did not tolerate an oral bisphosphonate.\par Prolia will be next on the agenda   \par \par \par \par \par \par Gillian 10, 2019\par \par PCP:    Dr. Larry Mendelowitz\par            GI:  Dr. Heredia  \par \par CC:  Osteoporosis      10/15/18  spine T -3.8, Z -2.3; TH T -3, Z -19;  FN T -3.2, Z -1.9\par                                     1/13/19 X-ray:  moderate compression fracture of T7\par         (On omeprazole for Barretts)\par         (smoker)\par \par Took Tymlos 5/17 and stopped after 2 weeks because of extreme fatigue, nausea, heart burn, pain in shoulders, elbows, wrists,\par So I advised her to stop it for now and she feels betters.   "The only thing I didn't have was the lightheadedness and the dizziness"\par \par Plan:  As she still notes bone pain, will not restart Tymlos or offer any alternative, although only Forteo is likely to have similar benefit.   \par \par \par May 17, 2019\par \par PCP:    Dr. Larry Mendelowitz\par            GI:  Dr. Heredia  \par \par CC:  Osteoporosis\par         (On omeprazole for Barretts)\par         (smoker)\par \par 5 ft 6 in 112 lbs at home.  Would like to weigh 125-130 lbs \par She likes chocolate, coffee ice cream, flying saucers.\par Has 2 - 3 BM/day which she attributes to the magnesium in her [employer] calcium citrate w/ vit D  "It would be perfect if there\par was vitamin K in it."   \par \par Today she was instructed in use of Tymlos and she did well.\par \par Plan:  Inject real Tymlos 80 mg HS.   Call me tomorrow to let me know how it goes.\par Go for updated labs a [facility] in two weeks.\par ROV here in August.  \par \par \par January 31, 2019\par \par PCP:    Dr. Larry Mendelowitz\par            GI:  Dr. Heredia  \par \par CC:  Osteoporosis\par         (On omeprazole for Barretts)\par         (smoker)\par \par 64 yo retired  in [place].\par \par 5 ft 6 in 112 lbs \par \par \par Impression:  Has marked osteoporosis.   Low BMI and smoking contribute.\par Will check for other causes and then negotiate addition of pharmacoligc intervention.\par

## 2020-04-13 ENCOUNTER — RX RENEWAL (OUTPATIENT)
Age: 65
End: 2020-04-13

## 2020-09-22 ENCOUNTER — APPOINTMENT (OUTPATIENT)
Dept: OBGYN | Facility: CLINIC | Age: 65
End: 2020-09-22
Payer: COMMERCIAL

## 2020-09-22 VITALS
WEIGHT: 107 LBS | HEIGHT: 66 IN | DIASTOLIC BLOOD PRESSURE: 78 MMHG | BODY MASS INDEX: 17.19 KG/M2 | SYSTOLIC BLOOD PRESSURE: 128 MMHG

## 2020-09-22 PROCEDURE — 99396 PREV VISIT EST AGE 40-64: CPT

## 2020-09-30 LAB
CYTOLOGY CVX/VAG DOC THIN PREP: ABNORMAL
HPV HIGH+LOW RISK DNA PNL CVX: NOT DETECTED

## 2020-11-30 ENCOUNTER — NON-APPOINTMENT (OUTPATIENT)
Age: 65
End: 2020-11-30

## 2021-02-10 ENCOUNTER — APPOINTMENT (OUTPATIENT)
Dept: INTERNAL MEDICINE | Facility: CLINIC | Age: 66
End: 2021-02-10
Payer: COMMERCIAL

## 2021-02-10 VITALS
BODY MASS INDEX: 17.84 KG/M2 | HEIGHT: 66 IN | WEIGHT: 111 LBS | TEMPERATURE: 98.2 F | SYSTOLIC BLOOD PRESSURE: 154 MMHG | DIASTOLIC BLOOD PRESSURE: 82 MMHG

## 2021-02-10 DIAGNOSIS — R21 RASH AND OTHER NONSPECIFIC SKIN ERUPTION: ICD-10-CM

## 2021-02-10 DIAGNOSIS — E53.8 DEFICIENCY OF OTHER SPECIFIED B GROUP VITAMINS: ICD-10-CM

## 2021-02-10 DIAGNOSIS — Z87.39 PERSONAL HISTORY OF OTHER DISEASES OF THE MUSCULOSKELETAL SYSTEM AND CONNECTIVE TISSUE: ICD-10-CM

## 2021-02-10 DIAGNOSIS — M54.40 LUMBAGO WITH SCIATICA, UNSPECIFIED SIDE: ICD-10-CM

## 2021-02-10 DIAGNOSIS — N64.89 OTHER SPECIFIED DISORDERS OF BREAST: ICD-10-CM

## 2021-02-10 DIAGNOSIS — Z87.898 PERSONAL HISTORY OF OTHER SPECIFIED CONDITIONS: ICD-10-CM

## 2021-02-10 DIAGNOSIS — Z13.820 ENCOUNTER FOR SCREENING FOR OSTEOPOROSIS: ICD-10-CM

## 2021-02-10 DIAGNOSIS — Z12.4 ENCOUNTER FOR SCREENING FOR MALIGNANT NEOPLASM OF CERVIX: ICD-10-CM

## 2021-02-10 DIAGNOSIS — E53.1 PYRIDOXINE DEFICIENCY: ICD-10-CM

## 2021-02-10 PROCEDURE — 36415 COLL VENOUS BLD VENIPUNCTURE: CPT

## 2021-02-10 PROCEDURE — 99072 ADDL SUPL MATRL&STAF TM PHE: CPT

## 2021-02-10 PROCEDURE — 99214 OFFICE O/P EST MOD 30 MIN: CPT | Mod: 25

## 2021-02-10 RX ORDER — DENOSUMAB 60 MG/ML
60 INJECTION SUBCUTANEOUS
Qty: 1 | Refills: 1 | Status: DISCONTINUED | COMMUNITY
Start: 2019-08-08 | End: 2021-02-10

## 2021-02-10 RX ORDER — CHLORHEXIDINE GLUCONATE 4 %
LIQUID (ML) TOPICAL
Refills: 0 | Status: DISCONTINUED | COMMUNITY
End: 2021-02-10

## 2021-02-10 NOTE — PHYSICAL EXAM
[Normal Outer Ear/Nose] : the outer ears and nose were normal in appearance [Normal TMs] : both tympanic membranes were normal [No JVD] : no jugular venous distention [Normal] : normal rate, regular rhythm, normal S1 and S2 and no murmur heard [No Edema] : there was no peripheral edema [No CVA Tenderness] : no CVA  tenderness [No Spinal Tenderness] : no spinal tenderness [Grossly Normal Strength/Tone] : grossly normal strength/tone [Coordination Grossly Intact] : coordination grossly intact [No Focal Deficits] : no focal deficits [Normal Gait] : normal gait [Normal Affect] : the affect was normal [Normal Insight/Judgement] : insight and judgment were intact

## 2021-02-10 NOTE — HISTORY OF PRESENT ILLNESS
[FreeTextEntry1] : follow-up \par hospital f/u\par First visit here in almost 2 years. [de-identified] : Pt is here today. She was admitted to [facility] from 11/27/20 until 12/3/20 and then she went to [facility] for rehab. She was admitted for a CVA. She says that she was unable to get out of bed and had not been eating well or drinking well for a couple of weeks. She tested negative for COVID on hospitalization but was positive for COVID antibodies. She still has mild numbness in her right arm but not weakness anywhere, no issues with speech, SHe is able to ambulate. When she was taken to [facility] she was not able to walk. Pt lives at home with her . She is able to do everything, all her ADL's on her own. She is able to drive.

## 2021-02-10 NOTE — HEALTH RISK ASSESSMENT
[No] : In the past 12 months have you used drugs other than those required for medical reasons? No [Any fall with injury in past year] : Patient reported fall with injury in the past year [0] : 2) Feeling down, depressed, or hopeless: Not at all (0) [Patient reported mammogram was normal] : Patient reported mammogram was normal [Patient reported PAP Smear was normal] : Patient reported PAP Smear was normal [Patient reported colonoscopy was abnormal] : Patient reported colonoscopy was abnormal [HIV test declined] : HIV test declined [Hepatitis C test declined] : Hepatitis C test declined [MammogramDate] : 10/19 [PapSmearDate] : 09/20 [ColonoscopyDate] : 08/18 [ColonoscopyComments] : due 8/21 [] : No [de-identified] : none [de-identified] : regular diet

## 2021-02-10 NOTE — REVIEW OF SYSTEMS
[Dyspnea on Exertion] : dyspnea on exertion [Negative] : Heme/Lymph [Fever] : no fever [Chills] : no chills [Fatigue] : no fatigue [Chest Pain] : no chest pain [Shortness Of Breath] : no shortness of breath [Wheezing] : no wheezing [Cough] : no cough [FreeTextEntry7] : if she does not take omeprazole , gets reflux [de-identified] : no residual weakness, just numbnes sin right hand

## 2021-02-13 LAB
25(OH)D3 SERPL-MCNC: 43.1 NG/ML
ALBUMIN SERPL ELPH-MCNC: 4.3 G/DL
ALP BLD-CCNC: 143 U/L
ALT SERPL-CCNC: 17 U/L
ANION GAP SERPL CALC-SCNC: 15 MMOL/L
AST SERPL-CCNC: 25 U/L
BASOPHILS # BLD AUTO: 0.05 K/UL
BASOPHILS NFR BLD AUTO: 0.8 %
BILIRUB SERPL-MCNC: 0.2 MG/DL
BUN SERPL-MCNC: 13 MG/DL
CALCIUM SERPL-MCNC: 10 MG/DL
CHLORIDE SERPL-SCNC: 98 MMOL/L
CO2 SERPL-SCNC: 26 MMOL/L
CREAT SERPL-MCNC: 0.83 MG/DL
EOSINOPHIL # BLD AUTO: 0.12 K/UL
EOSINOPHIL NFR BLD AUTO: 2 %
ESTIMATED AVERAGE GLUCOSE: 103 MG/DL
GLUCOSE SERPL-MCNC: 88 MG/DL
HBA1C MFR BLD HPLC: 5.2 %
HCT VFR BLD CALC: 37.8 %
HGB BLD-MCNC: 11.8 G/DL
IMM GRANULOCYTES NFR BLD AUTO: 0.2 %
LYMPHOCYTES # BLD AUTO: 1.1 K/UL
LYMPHOCYTES NFR BLD AUTO: 18.1 %
MAN DIFF?: NORMAL
MCHC RBC-ENTMCNC: 29.6 PG
MCHC RBC-ENTMCNC: 31.2 GM/DL
MCV RBC AUTO: 94.7 FL
MONOCYTES # BLD AUTO: 0.65 K/UL
MONOCYTES NFR BLD AUTO: 10.7 %
NEUTROPHILS # BLD AUTO: 4.15 K/UL
NEUTROPHILS NFR BLD AUTO: 68.2 %
PLATELET # BLD AUTO: 336 K/UL
POTASSIUM SERPL-SCNC: 4.7 MMOL/L
PROT SERPL-MCNC: 7.4 G/DL
RBC # BLD: 3.99 M/UL
RBC # FLD: 13.2 %
SODIUM SERPL-SCNC: 138 MMOL/L
TSH SERPL-ACNC: 1.24 UIU/ML
WBC # FLD AUTO: 6.08 K/UL

## 2021-02-15 LAB
CHOLEST SERPL-MCNC: 223 MG/DL
CREAT SPEC-SCNC: 19 MG/DL
HDLC SERPL-MCNC: 78 MG/DL
LDLC SERPL CALC-MCNC: 126 MG/DL
MICROALBUMIN 24H UR DL<=1MG/L-MCNC: 2.7 MG/DL
MICROALBUMIN/CREAT 24H UR-RTO: 145 MG/G
NONHDLC SERPL-MCNC: 145 MG/DL
TRIGL SERPL-MCNC: 95 MG/DL

## 2021-03-25 ENCOUNTER — RESULT REVIEW (OUTPATIENT)
Age: 66
End: 2021-03-25

## 2021-04-04 ENCOUNTER — RX RENEWAL (OUTPATIENT)
Age: 66
End: 2021-04-04

## 2021-05-17 ENCOUNTER — APPOINTMENT (OUTPATIENT)
Dept: ENDOCRINOLOGY | Facility: CLINIC | Age: 66
End: 2021-05-17

## 2021-06-15 ENCOUNTER — APPOINTMENT (OUTPATIENT)
Dept: INTERNAL MEDICINE | Facility: CLINIC | Age: 66
End: 2021-06-15

## 2021-08-02 ENCOUNTER — APPOINTMENT (OUTPATIENT)
Dept: INTERNAL MEDICINE | Facility: CLINIC | Age: 66
End: 2021-08-02
Payer: COMMERCIAL

## 2021-08-02 VITALS
TEMPERATURE: 98.4 F | BODY MASS INDEX: 16.07 KG/M2 | SYSTOLIC BLOOD PRESSURE: 160 MMHG | HEIGHT: 66 IN | WEIGHT: 100 LBS | DIASTOLIC BLOOD PRESSURE: 82 MMHG

## 2021-08-02 DIAGNOSIS — Z92.89 PERSONAL HISTORY OF OTHER MEDICAL TREATMENT: ICD-10-CM

## 2021-08-02 PROCEDURE — 99406 BEHAV CHNG SMOKING 3-10 MIN: CPT

## 2021-08-02 PROCEDURE — 36415 COLL VENOUS BLD VENIPUNCTURE: CPT

## 2021-08-02 PROCEDURE — 99214 OFFICE O/P EST MOD 30 MIN: CPT | Mod: 25

## 2021-08-02 RX ORDER — UBIDECARENONE 200 MG
200 CAPSULE ORAL
Refills: 0 | Status: ACTIVE | COMMUNITY

## 2021-08-02 RX ORDER — SAW/PYGEUM/BETA/HERB/D3/B6/ZN 30 MG-25MG
200 CAPSULE ORAL
Refills: 0 | Status: ACTIVE | COMMUNITY

## 2021-08-02 NOTE — PHYSICAL EXAM
[No Acute Distress] : no acute distress [No JVD] : no jugular venous distention [No Lymphadenopathy] : no lymphadenopathy [Supple] : supple [Normal] : normal rate, regular rhythm, normal S1 and S2 and no murmur heard [Coordination Grossly Intact] : coordination grossly intact [No Focal Deficits] : no focal deficits [Normal Gait] : normal gait [Normal Insight/Judgement] : insight and judgment were intact [de-identified] : very thin

## 2021-08-02 NOTE — REVIEW OF SYSTEMS
[Negative] : Heme/Lymph [Fever] : no fever [Suicidal] : not suicidal [Insomnia] : no insomnia [Anxiety] : no anxiety [Depression] : no depression [FreeTextEntry2] : improved fatigue [de-identified] : down due to the passing of her

## 2021-08-02 NOTE — HEALTH RISK ASSESSMENT
[] : Yes [0-4] : 0-4 [No] : In the past 12 months have you used drugs other than those required for medical reasons? No [0] : 2) Feeling down, depressed, or hopeless: Not at all (0) [Patient reported mammogram was normal] : Patient reported mammogram was normal [Patient reported PAP Smear was normal] : Patient reported PAP Smear was normal [Patient reported bone density results were abnormal] : Patient reported bone density results were abnormal [Patient reported colonoscopy was abnormal] : Patient reported colonoscopy was abnormal [HIV test declined] : HIV test declined [Hepatitis C test declined] : Hepatitis C test declined [MammogramDate] : 03/21 [PapSmearDate] : 09/20 [BoneDensityDate] : 03/21 [BoneDensityComments] : osteoporosis, only wants vit D [ColonoscopyDate] : 08/18 [ColonoscopyComments] : due 8/21 [de-identified] : none [de-identified] : regular diet

## 2021-08-02 NOTE — COUNSELING
[Risk of tobacco use and health benefits of smoking cessation discussed] : Risk of tobacco use and health benefits of smoking cessation discussed [Cessation strategies including cessation program discussed] : Cessation strategies including cessation program discussed [Use of nicotine replacement therapies and other medications discussed] : Use of nicotine replacement therapies and other medications discussed [Encouraged to pick a quit date and identify support needed to quit] : Encouraged to pick a quit date and identify support needed to quit [Willing to Quit Smoking] : Willing to quit smoking [Tobacco Use Cessation Intermediate Greater Than 3 Minutes Up to 10 Minutes] : Tobacco Use Cessation Intermediate Greater Than 3 Minutes Up to 10 Minutes [FreeTextEntry1] : 5 [FreeTextEntry2] : she wants to try the gum.

## 2021-08-02 NOTE — HISTORY OF PRESENT ILLNESS
[FreeTextEntry1] : follow-up  [de-identified] : Pt here for f/u visit. She still has numbness in the right hand. \par She lost her  in May of prostate cancer. \par Pt is taking vitamin D which has helped her energy level. \par She has seen a holistic physician Ana Luisa Morales and blood work order was given. \par Pt still against taking any antihypertensive or statin medication , despite having a stroke. She wants to do it all naturally. \par Pt was diagnosed with COVID in November and was vaccinated with [employer]&[facility] in [facility]

## 2021-08-03 LAB — 25(OH)D3 SERPL-MCNC: 58.4 NG/ML

## 2021-08-04 LAB
ALBUMIN SERPL ELPH-MCNC: 4.7 G/DL
ALP BLD-CCNC: 97 U/L
ALT SERPL-CCNC: 13 U/L
ANION GAP SERPL CALC-SCNC: 12 MMOL/L
APPEARANCE: CLEAR
AST SERPL-CCNC: 23 U/L
BASOPHILS # BLD AUTO: 0.06 K/UL
BASOPHILS NFR BLD AUTO: 0.9 %
BILIRUB SERPL-MCNC: 0.2 MG/DL
BILIRUBIN URINE: NEGATIVE
BLOOD URINE: NEGATIVE
BUN SERPL-MCNC: 12 MG/DL
CALCIUM SERPL-MCNC: 10.2 MG/DL
CHLORIDE SERPL-SCNC: 94 MMOL/L
CHOLEST SERPL-MCNC: 223 MG/DL
CO2 SERPL-SCNC: 30 MMOL/L
COLOR: NORMAL
CREAT SERPL-MCNC: 0.7 MG/DL
DEPRECATED D DIMER PPP IA-ACNC: 538 NG/ML DDU
EOSINOPHIL # BLD AUTO: 0.05 K/UL
EOSINOPHIL NFR BLD AUTO: 0.8 %
ESTIMATED AVERAGE GLUCOSE: 114 MG/DL
FERRITIN SERPL-MCNC: 79 NG/ML
FIBRINOGEN PPP COAG.DERIVED-MCNC: 632 MG/DL
FOLATE SERPL-MCNC: >20 NG/ML
GLUCOSE QUALITATIVE U: NEGATIVE
GLUCOSE SERPL-MCNC: 93 MG/DL
HBA1C MFR BLD HPLC: 5.6 %
HCT VFR BLD CALC: 39.3 %
HDLC SERPL-MCNC: 76 MG/DL
HGB BLD-MCNC: 13 G/DL
IMM GRANULOCYTES NFR BLD AUTO: 0.3 %
IRON SATN MFR SERPL: 16 %
IRON SERPL-MCNC: 52 UG/DL
KETONES URINE: NEGATIVE
LDLC SERPL CALC-MCNC: 126 MG/DL
LEUKOCYTE ESTERASE URINE: NEGATIVE
LYMPHOCYTES # BLD AUTO: 1.4 K/UL
LYMPHOCYTES NFR BLD AUTO: 21.1 %
MAN DIFF?: NORMAL
MCHC RBC-ENTMCNC: 29.5 PG
MCHC RBC-ENTMCNC: 33.1 GM/DL
MCV RBC AUTO: 89.1 FL
MONOCYTES # BLD AUTO: 0.71 K/UL
MONOCYTES NFR BLD AUTO: 10.7 %
NEUTROPHILS # BLD AUTO: 4.39 K/UL
NEUTROPHILS NFR BLD AUTO: 66.2 %
NITRITE URINE: NEGATIVE
NONHDLC SERPL-MCNC: 147 MG/DL
PH URINE: 7
PLATELET # BLD AUTO: 420 K/UL
POTASSIUM SERPL-SCNC: 4.7 MMOL/L
PROT SERPL-MCNC: 7.6 G/DL
PROTEIN URINE: NEGATIVE
RBC # BLD: 4.41 M/UL
RBC # FLD: 13.9 %
SODIUM SERPL-SCNC: 136 MMOL/L
SPECIFIC GRAVITY URINE: 1.01
TIBC SERPL-MCNC: 328 UG/DL
TRIGL SERPL-MCNC: 104 MG/DL
UIBC SERPL-MCNC: 276 UG/DL
UROBILINOGEN URINE: NORMAL
VIT B12 SERPL-MCNC: 642 PG/ML
WBC # FLD AUTO: 6.63 K/UL

## 2021-08-05 LAB
HOMOCYSTEINE LEVEL: 13 UMOL/L
METHYLMALONIC ACID LEVEL: 205 NMOL/L

## 2021-08-06 ENCOUNTER — APPOINTMENT (OUTPATIENT)
Dept: GASTROENTEROLOGY | Facility: CLINIC | Age: 66
End: 2021-08-06
Payer: COMMERCIAL

## 2021-08-06 VITALS
OXYGEN SATURATION: 96 % | WEIGHT: 100 LBS | DIASTOLIC BLOOD PRESSURE: 90 MMHG | HEART RATE: 77 BPM | SYSTOLIC BLOOD PRESSURE: 130 MMHG | BODY MASS INDEX: 16.07 KG/M2 | TEMPERATURE: 97.2 F | HEIGHT: 66 IN

## 2021-08-06 DIAGNOSIS — Z86.010 PERSONAL HISTORY OF COLONIC POLYPS: ICD-10-CM

## 2021-08-06 PROCEDURE — 99244 OFF/OP CNSLTJ NEW/EST MOD 40: CPT

## 2021-08-06 NOTE — ASSESSMENT
[FreeTextEntry1] : 1.Barretts:  EGD scheduled\par \par 2.History of  colon polyps:  Colonoscopy scheduled \par \par 3.Weight loss:  EGD  / colonoscopy. Would  consider chest CT given long history of  smoking\par \par Risks of the procedures including but not limited to bleeding / perforation / infection / anesthesia complication / missed  lesions explained to the  patient . The patient expressed understanding and a desire to proceed with the procedures.\par \par Risk of not doing procedures includes but is not limited to missed or delayed diagnosis of gastric pathology, colon cancer or other gastrointestinal pathology\par \par

## 2021-08-06 NOTE — HISTORY OF PRESENT ILLNESS
[de-identified] : TARUN CANNON  is being evaluated at the request of Dr. Limon  for an opinion re: h/o Barretts / abnormal CT scan / history of  colon polyp. Patient  continue  to smoke  despite  counselling to quit\par \par In [facility]  ED  11/2020 with confusion / elevated WBC  and  CT with gastric  antral wall thickening / diverticulosis / gallstones / nodular liver  ( spoke  with Dr. Briceño who had  not seen patient  at the time  of his  call to me.  He was called as a GI consult) .  Apparently w/u revealed  a CVA  / positive  COVID ab. No EGD performed as per patient. Discharged eventually to [facility]\par \par  EGD / colonoscopy in 11/2014 confirmed Barretts esophagus and revealed hemorrhoids /diverticulosis and a hyperplastic polyp. Repeat colonoscopy recommended in 5 years / EGD in 1 yr. \par

## 2021-08-06 NOTE — CONSULT LETTER
[Dear  ___] : Dear  [unfilled], [Consult Letter:] : I had the pleasure of evaluating your patient, [unfilled]. [Please see my note below.] : Please see my note below. [Consult Closing:] : Thank you very much for allowing me to participate in the care of this patient.  If you have any questions, please do not hesitate to contact me. [Sincerely,] : Sincerely, [FreeTextEntry3] : Diogo Heredia MD\par tel: [phone]\par fax: [phone]\par

## 2021-08-06 NOTE — PHYSICAL EXAM
[General Appearance - Alert] : alert [General Appearance - In No Acute Distress] : in no acute distress [Sclera] : the sclera and conjunctiva were normal [Outer Ear] : the ears and nose were normal in appearance [Neck Appearance] : the appearance of the neck was normal [] : no respiratory distress [Abdomen Soft] : soft [Abnormal Walk] : normal gait [Skin Color & Pigmentation] : normal skin color and pigmentation [FreeTextEntry1] : r [Oriented To Time, Place, And Person] : oriented to person, place, and time

## 2021-08-12 ENCOUNTER — NON-APPOINTMENT (OUTPATIENT)
Age: 66
End: 2021-08-12

## 2021-08-17 ENCOUNTER — NON-APPOINTMENT (OUTPATIENT)
Age: 66
End: 2021-08-17

## 2021-09-23 ENCOUNTER — RESULT REVIEW (OUTPATIENT)
Age: 66
End: 2021-09-23

## 2021-09-23 ENCOUNTER — APPOINTMENT (OUTPATIENT)
Dept: HEMATOLOGY ONCOLOGY | Facility: CLINIC | Age: 66
End: 2021-09-23
Payer: COMMERCIAL

## 2021-09-23 ENCOUNTER — APPOINTMENT (OUTPATIENT)
Dept: OBGYN | Facility: CLINIC | Age: 66
End: 2021-09-23
Payer: COMMERCIAL

## 2021-09-23 VITALS
HEART RATE: 102 BPM | OXYGEN SATURATION: 98 % | HEIGHT: 66 IN | BODY MASS INDEX: 16.04 KG/M2 | TEMPERATURE: 98 F | RESPIRATION RATE: 16 BRPM | DIASTOLIC BLOOD PRESSURE: 85 MMHG | SYSTOLIC BLOOD PRESSURE: 142 MMHG | WEIGHT: 99.8 LBS

## 2021-09-23 VITALS
HEIGHT: 66 IN | DIASTOLIC BLOOD PRESSURE: 80 MMHG | SYSTOLIC BLOOD PRESSURE: 122 MMHG | WEIGHT: 100 LBS | BODY MASS INDEX: 16.07 KG/M2

## 2021-09-23 PROCEDURE — 99205 OFFICE O/P NEW HI 60 MIN: CPT

## 2021-09-23 PROCEDURE — 99396 PREV VISIT EST AGE 40-64: CPT

## 2021-09-23 NOTE — REVIEW OF SYSTEMS
[Night Sweats] : night sweats [Fatigue] : fatigue [Recent Change In Weight] : ~T recent weight change [SOB on Exertion] : shortness of breath during exertion [Anxiety] : anxiety [Negative] : Allergic/Immunologic
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
## 2021-09-23 NOTE — HISTORY OF PRESENT ILLNESS
[de-identified] : Ms. Shiloh Ruelas is 65 year old female with thrombocytosis and CVA here for consultation\par \par Patient is current smoker with history of Nance's esophagus, osteoporosis, HTN, hypercholesterolemia, hyperlipidemia, lyme disease about 5 years ago with extreme exhaustion.  Patient reports severe exhaustion, can fall asleep at the moment if she's laying down. Exhaustion started around Oct 2020, hospitalized at [facility] with positive Covid antibodies  and was told that she had 'Covid stroke".  She's discharged home with Eliquis 5mg bid. She's scheduled for colonoscopy with Dr. Heredia this Monday 9/27/21.  \par \par 8/4/21 wbc 6.63 Hgb 13.0 hct 39.3 plts 420  (normal prior to Feb 2021)\par \par FHx: Mother with cervical cancer in his 60s,  P. aunt with colon cancer in his early 60\par SHx: Former smoker - smoked all her adulthood, quit for six month, resumed from 5- August 2021. \par \par Mammogram was in March 2021. \par She received her Carlton and Carlton shot in April 2021. \par She feels her heart race with Eliquis\par She lives alone since her  passed away in May 2021 from prostate cancer. \par

## 2021-09-24 ENCOUNTER — RESULT REVIEW (OUTPATIENT)
Age: 66
End: 2021-09-24

## 2021-09-26 ENCOUNTER — RESULT REVIEW (OUTPATIENT)
Age: 66
End: 2021-09-26

## 2021-09-27 ENCOUNTER — APPOINTMENT (OUTPATIENT)
Dept: GASTROENTEROLOGY | Facility: HOSPITAL | Age: 66
End: 2021-09-27

## 2021-09-30 ENCOUNTER — APPOINTMENT (OUTPATIENT)
Dept: FAMILY MEDICINE | Facility: CLINIC | Age: 66
End: 2021-09-30
Payer: COMMERCIAL

## 2021-09-30 ENCOUNTER — LABORATORY RESULT (OUTPATIENT)
Age: 66
End: 2021-09-30

## 2021-09-30 VITALS
SYSTOLIC BLOOD PRESSURE: 120 MMHG | OXYGEN SATURATION: 98 % | BODY MASS INDEX: 16.23 KG/M2 | WEIGHT: 101 LBS | HEIGHT: 66 IN | DIASTOLIC BLOOD PRESSURE: 70 MMHG | HEART RATE: 103 BPM

## 2021-09-30 PROCEDURE — 99213 OFFICE O/P EST LOW 20 MIN: CPT

## 2021-09-30 RX ORDER — OMEPRAZOLE 40 MG/1
40 CAPSULE, DELAYED RELEASE ORAL
Qty: 90 | Refills: 3 | Status: DISCONTINUED | COMMUNITY
Start: 2019-04-25 | End: 2021-09-30

## 2021-10-04 LAB
B BURGDOR AB SER-IMP: POSITIVE
B BURGDOR IGM PATRN SER IB-IMP: NEGATIVE
B BURGDOR18KD IGG SER QL IB: NORMAL
B BURGDOR23KD IGG SER QL IB: NORMAL
B BURGDOR23KD IGM SER QL IB: NORMAL
B BURGDOR28KD IGG SER QL IB: NORMAL
B BURGDOR30KD IGG SER QL IB: NORMAL
B BURGDOR31KD IGG SER QL IB: NORMAL
B BURGDOR39KD IGG SER QL IB: PRESENT
B BURGDOR39KD IGM SER QL IB: NORMAL
B BURGDOR41KD IGG SER QL IB: PRESENT
B BURGDOR41KD IGM SER QL IB: NORMAL
B BURGDOR45KD IGG SER QL IB: PRESENT
B BURGDOR58KD IGG SER QL IB: NORMAL
B BURGDOR66KD IGG SER QL IB: PRESENT
B BURGDOR93KD IGG SER QL IB: PRESENT

## 2021-10-04 NOTE — HEALTH RISK ASSESSMENT
[0-4] : 0-4 [No] : In the past 12 months have you used drugs other than those required for medical reasons? No [No falls in past year] : Patient reported no falls in the past year [0] : 2) Feeling down, depressed, or hopeless: Not at all (0) [] : No [de-identified] : former [YearQuit] : 08/2021 [de-identified] : none [de-identified] : normal

## 2021-10-04 NOTE — HISTORY OF PRESENT ILLNESS
[FreeTextEntry8] : Pt is a 64 y/o F with PMH of Nance's, CVA, anemia, HTN and chronic shortness of breath is here today requesting a bloodtest for lyme. Says she had lyme years ago and it felt similar. She was recently seen by Heme/Onc and had labs drawn but hasn't been informed of her results yet. She was seeing them to discuss whether she needs to continue with the Eliquis. \par She reports feeling very sad and distressed since her   in 2021. She says she started losing weight last year and has a poor appetite. Had a colonoscopy and EGD done 3 days ago and was advised to repeat the EGD in 3 years and colonoscopy in 5 years. She reports fatigue and general malaise and wants to r/o lyme. \par

## 2021-10-08 ENCOUNTER — NON-APPOINTMENT (OUTPATIENT)
Age: 66
End: 2021-10-08

## 2021-10-08 LAB
A PHAGOCYTOPH IGG TITR SER IF: NORMAL TITER
B BURGDOR AB SER QL IA: POSITIVE
B MICROTI IGG TITR SER: NORMAL TITER
E CHAFFEENSIS IGG TITR SER IF: NORMAL TITER

## 2021-10-13 ENCOUNTER — NON-APPOINTMENT (OUTPATIENT)
Age: 66
End: 2021-10-13

## 2021-10-14 ENCOUNTER — APPOINTMENT (OUTPATIENT)
Dept: HEMATOLOGY ONCOLOGY | Facility: CLINIC | Age: 66
End: 2021-10-14
Payer: COMMERCIAL

## 2021-10-14 VITALS
RESPIRATION RATE: 16 BRPM | BODY MASS INDEX: 16.29 KG/M2 | DIASTOLIC BLOOD PRESSURE: 80 MMHG | OXYGEN SATURATION: 97 % | HEART RATE: 75 BPM | SYSTOLIC BLOOD PRESSURE: 126 MMHG | WEIGHT: 101.38 LBS | TEMPERATURE: 97.1 F | HEIGHT: 66 IN

## 2021-10-14 PROCEDURE — 99214 OFFICE O/P EST MOD 30 MIN: CPT

## 2021-10-14 NOTE — HISTORY OF PRESENT ILLNESS
[de-identified] : Ms. Shiloh Ruelas is 65 year old female with thrombocytosis and CVA here for consultation\par \par Patient is current smoker with history of Nance's esophagus, osteoporosis, HTN, hypercholesterolemia, hyperlipidemia, lyme disease about 5 years ago with extreme exhaustion.  Patient reports severe exhaustion, can fall asleep at the moment if she's laying down. Exhaustion started around Oct 2020, hospitalized at [facility] with positive Covid antibodies  and was told that she had 'Covid stroke".  She's discharged home with Eliquis 5mg bid. She's scheduled for colonoscopy with Dr. Heredia this Monday 9/27/21.  \par \par 8/4/21 wbc 6.63 Hgb 13.0 hct 39.3 plts 420  (normal prior to Feb 2021)\par \par FHx: Mother with cervical cancer in his 60s,  P. aunt with colon cancer in his early 60\par SHx: Former smoker - smoked all her adulthood, quit for six month, resumed from 5- August 2021. \par \par Mammogram was in March 2021. \par She received her Carlton and Carlton shot in April 2021. \par She feels her heart race with Eliquis\par She lives alone since her  passed away in May 2021 from prostate cancer. \par  [de-identified] : She is seen today for follow up\par Her brother is visiting her and she prefers to keep the visit short\par \par She was admitted [facility] for shortness of breath. Had CT chest which showed Centrilobular emphysema moderate bilaterally. No suspicious nodules or masses. Bilateral small left greater than right pleural effusions with adjacent atelectasis. Coronary artery calcifications. T6 and T7 age-indeterminate vertebral body compression fractures. \par \par Seen by  cardiologist Dr. Chirinos - scheduled stents on 10/18/21. \par Had colonoscopy with Dr. Heredia in Sept 25, 2021\par \par Continues to be on eliquis
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
## 2021-10-18 ENCOUNTER — APPOINTMENT (OUTPATIENT)
Dept: HEART AND VASCULAR | Facility: CLINIC | Age: 66
End: 2021-10-18
Payer: COMMERCIAL

## 2021-10-18 VITALS
TEMPERATURE: 97.5 F | SYSTOLIC BLOOD PRESSURE: 126 MMHG | DIASTOLIC BLOOD PRESSURE: 75 MMHG | BODY MASS INDEX: 16.07 KG/M2 | WEIGHT: 100 LBS | OXYGEN SATURATION: 96 % | HEIGHT: 66 IN | HEART RATE: 59 BPM

## 2021-10-18 PROCEDURE — 99214 OFFICE O/P EST MOD 30 MIN: CPT

## 2021-10-18 RX ORDER — ESOMEPRAZOLE MAGNESIUM 20 MG/1
20 CAPSULE, DELAYED RELEASE ORAL DAILY
Qty: 30 | Refills: 6 | Status: DISCONTINUED | COMMUNITY
Start: 2021-09-07 | End: 2021-10-18

## 2021-10-18 RX ORDER — DOXYCYCLINE HYCLATE 100 MG/1
100 CAPSULE ORAL
Qty: 42 | Refills: 0 | Status: DISCONTINUED | COMMUNITY
Start: 2021-10-04 | End: 2021-10-18

## 2021-10-18 RX ORDER — ERGOCALCIFEROL 1.25 MG/1
1.25 MG CAPSULE, LIQUID FILLED ORAL
Refills: 0 | Status: ACTIVE | COMMUNITY

## 2021-10-18 NOTE — ASSESSMENT
[FreeTextEntry1] : 64 y/o F with chronic systolic heart failure (LVEF 25%), 3V CAD, COPD, hx of Lyme disease, thrombocytosis with recent CHF exacerabtion that left without revascularization presents to clinic. We discussed options including medical therapy vs PCI vs CABG. Pt does not wish to pursue CABG, as she believes (and I am in agreement as well) that she is very high risk due to lungs/heart. I asked her to see a CT surgeon to discuss, but pt refused. She wishes to pursue high risk CHIP-PCI after long discussion. \par -continue metoprolol for CHF\par -Eliquis for thromocytosis, need to discuss case with Hematology\par -lasix 20mg daily, euvolemic on exam\par -uptitrate GDMT\par -family discussion (will discuss case with family) and then plan for CHIP-PCI.\par

## 2021-10-18 NOTE — PHYSICAL EXAM
[Frail] : frail [Normal S1, S2] : normal S1, S2 [Murmur] : murmur [Hayley ____] : hayley [unfilled] [Wheeze ____] : wheeze [unfilled] [Soft] : abdomen soft [Normal Gait] : normal gait [No Edema] : no edema [No Rash] : no rash [Moves all extremities] : moves all extremities [No Focal Deficits] : no focal deficits [Alert and Oriented] : alert and oriented [Normal memory] : normal memory [de-identified] : soft systolic

## 2021-10-18 NOTE — REVIEW OF SYSTEMS
[Fever] : no fever [Chills] : no chills [Blurry Vision] : no blurred vision [Earache] : no earache [Sore Throat] : no sore throat [SOB] : shortness of breath [Dyspnea on exertion] : dyspnea during exertion [Chest Discomfort] : no chest discomfort [Cough] : no cough [Abdominal Pain] : no abdominal pain [Change in Appetite] : no change in appetite [Constipation] : no constipation [Dysuria] : no dysuria [Joint Pain] : no joint pain [Rash] : no rash [Dizziness] : no dizziness [Convulsions] : no convulsions [Confusion] : no confusion was observed [Easy Bleeding] : no tendency for easy bleeding

## 2021-10-18 NOTE — REASON FOR VISIT
[Cardiac Failure] : cardiac failure [Coronary Artery Disease] : coronary artery disease [FreeTextEntry1] : 66 y/o F with pmhx of Lyme disease on doxycycline, COPD, hx of COVID, thrombocytosis (on Eliquis), +smoker with recent hospitalization for COPD exacerbation/PNA and acute systolic heart failure. She [facility] and after diuresis was s/p cardiac cath showing 3V CAD.\par TTE reviewed and LVEF 25%, cath reviewed- 3V CAD, CXR reviewed- emphysema, EKG reviewed- NSR< LVH with septal infarct.\par Since discharge pt with stable NYHA III symptoms (SOB with exertion). Denies any new CP symptoms.

## 2021-10-18 NOTE — CARDIOLOGY SUMMARY
[de-identified] : 10/12/2021:\par  EKG reviewed- NSR< LVH with septal infarct. [de-identified] : 10/12/2021: TTE reviewed and LVEF 25% [de-identified] : 10/12/2021: cath reviewed- 3V CAD, Intermediate SYNTAX

## 2021-10-22 ENCOUNTER — NON-APPOINTMENT (OUTPATIENT)
Age: 66
End: 2021-10-22

## 2021-10-25 ENCOUNTER — APPOINTMENT (OUTPATIENT)
Dept: FAMILY MEDICINE | Facility: CLINIC | Age: 66
End: 2021-10-25
Payer: COMMERCIAL

## 2021-10-25 VITALS
OXYGEN SATURATION: 94 % | WEIGHT: 98 LBS | HEIGHT: 66 IN | SYSTOLIC BLOOD PRESSURE: 118 MMHG | HEART RATE: 64 BPM | TEMPERATURE: 95 F | DIASTOLIC BLOOD PRESSURE: 80 MMHG | BODY MASS INDEX: 15.75 KG/M2

## 2021-10-25 PROCEDURE — 36415 COLL VENOUS BLD VENIPUNCTURE: CPT

## 2021-10-25 PROCEDURE — 99213 OFFICE O/P EST LOW 20 MIN: CPT | Mod: 25

## 2021-10-26 NOTE — HISTORY OF PRESENT ILLNESS
[de-identified] : Pt is a 64 y/o F that presents to clinic for a f/u visit. Was diagnosed with lyme disease. would like to have labs done to see if lyme infection has resolved. She started the Doxycycline but said she had a lot of stomach problems with it and needed it switched to another antibiotic. She does not recall the name. Has been feeling better and is following up with cardiology/hem/onc. Shortness of breath has improved.

## 2021-11-02 LAB
B BURGDOR AB SER-IMP: NEGATIVE
B BURGDOR IGM PATRN SER IB-IMP: NEGATIVE
B BURGDOR18KD IGG SER QL IB: NORMAL
B BURGDOR23KD IGG SER QL IB: NORMAL
B BURGDOR23KD IGM SER QL IB: NORMAL
B BURGDOR28KD IGG SER QL IB: NORMAL
B BURGDOR30KD IGG SER QL IB: NORMAL
B BURGDOR31KD IGG SER QL IB: PRESENT
B BURGDOR39KD IGG SER QL IB: PRESENT
B BURGDOR39KD IGM SER QL IB: NORMAL
B BURGDOR41KD IGG SER QL IB: PRESENT
B BURGDOR41KD IGM SER QL IB: PRESENT
B BURGDOR45KD IGG SER QL IB: PRESENT
B BURGDOR58KD IGG SER QL IB: NORMAL
B BURGDOR66KD IGG SER QL IB: NORMAL
B BURGDOR93KD IGG SER QL IB: NORMAL

## 2021-11-05 ENCOUNTER — APPOINTMENT (OUTPATIENT)
Dept: INTERNAL MEDICINE | Facility: CLINIC | Age: 66
End: 2021-11-05
Payer: COMMERCIAL

## 2021-11-05 ENCOUNTER — NON-APPOINTMENT (OUTPATIENT)
Age: 66
End: 2021-11-05

## 2021-11-05 VITALS
TEMPERATURE: 98.4 F | DIASTOLIC BLOOD PRESSURE: 70 MMHG | WEIGHT: 100 LBS | HEART RATE: 74 BPM | SYSTOLIC BLOOD PRESSURE: 128 MMHG | HEIGHT: 66 IN | BODY MASS INDEX: 16.07 KG/M2 | OXYGEN SATURATION: 98 %

## 2021-11-05 DIAGNOSIS — R03.0 ELEVATED BLOOD-PRESSURE READING, W/OUT DIAGNOSIS OF HYPERTENSION: ICD-10-CM

## 2021-11-05 DIAGNOSIS — R73.03 PREDIABETES.: ICD-10-CM

## 2021-11-05 DIAGNOSIS — Z87.898 PERSONAL HISTORY OF OTHER SPECIFIED CONDITIONS: ICD-10-CM

## 2021-11-05 PROCEDURE — 99496 TRANSJ CARE MGMT HIGH F2F 7D: CPT

## 2021-11-06 NOTE — REVIEW OF SYSTEMS
[Dyspnea on Exertion] : dyspnea on exertion [Negative] : Heme/Lymph [Fever] : no fever [Fatigue] : no fatigue [Chest Pain] : no chest pain [Lower Ext Edema] : no lower extremity edema [Shortness Of Breath] : no shortness of breath [Wheezing] : no wheezing [Cough] : no cough

## 2021-11-06 NOTE — HISTORY OF PRESENT ILLNESS
[Post-hospitalization from ___ Hospital] : Post-hospitalization from [unfilled] Hospital [Admitted on: ___] : The patient was admitted on [unfilled] [Discharged on ___] : discharged on [unfilled] [Discharge Summary] : discharge summary [Pertinent Labs] : pertinent labs [Radiology Findings] : radiology findings [Discharge Med List] : discharge medication list [Med Reconciliation] : medication reconciliation has been completed [Patient Contacted By: ____] : and contacted by [unfilled] [FreeTextEntry2] : Pt was admitted to [facility] with feeling unwell. She had Lyme disease and  subsequently had a CT chest done which showed emphysema and also had cardiac cath which showed 3 vessel disease. She is currently awaiting treatment. She also was found to have CHF. She was started on Entresto and Carvedilol. She is seeing cardiology at [facility] ([facility], [facility], Dr LEIGH Wallace), as she went for a second opinion. Most likely will be having her treatment at [facility]. She does not want to go to [place] as her support is here in [place].\par Since d/c pt is feeling well. She does get out of breath on exertion but recovers quickly. No SOB on rest. No chest pain

## 2021-11-06 NOTE — PHYSICAL EXAM
[60022 - High Complexity requires an extensive number of possible diagnoses and/or the management options, extensive complexity of the medical data (tests, etc.) to be reviewed, and a high risk of significant complications, morbidity, and/or mortality as w] : High Complexity  [No Acute Distress] : no acute distress [No JVD] : no jugular venous distention [No Lymphadenopathy] : no lymphadenopathy [Supple] : supple [Normal] : normal rate, regular rhythm, normal S1 and S2 and no murmur heard [No Edema] : there was no peripheral edema [Coordination Grossly Intact] : coordination grossly intact [No Focal Deficits] : no focal deficits [Normal Gait] : normal gait [Alert and Oriented x3] : oriented to person, place, and time [Normal Mood] : the mood was normal [Normal Insight/Judgement] : insight and judgment were intact [de-identified] : thin

## 2021-11-06 NOTE — HEALTH RISK ASSESSMENT
[No] : In the past 12 months have you used drugs other than those required for medical reasons? No [No falls in past year] : Patient reported no falls in the past year [0] : 2) Feeling down, depressed, or hopeless: Not at all (0) [Patient reported mammogram was normal] : Patient reported mammogram was normal [Patient reported PAP Smear was normal] : Patient reported PAP Smear was normal [Patient reported bone density results were abnormal] : Patient reported bone density results were abnormal [Patient reported colonoscopy was abnormal] : Patient reported colonoscopy was abnormal [HIV test declined] : HIV test declined [Hepatitis C test declined] : Hepatitis C test declined [None] : None [Alone] : lives alone [] :  [# Of Children ___] : has [unfilled] children [Feels Safe at Home] : Feels safe at home [Fully functional (bathing, dressing, toileting, transferring, walking, feeding)] : Fully functional (bathing, dressing, toileting, transferring, walking, feeding) [Fully functional (using the telephone, shopping, preparing meals, housekeeping, doing laundry, using] : Fully functional and needs no help or supervision to perform IADLs (using the telephone, shopping, preparing meals, housekeeping, doing laundry, using transportation, managing medications and managing finances) [] : No [de-identified] : none [de-identified] : regular diet  [MammogramDate] : 03/21 [PapSmearDate] : 09/20 [BoneDensityComments] : osteoporosis,  [BoneDensityDate] : 03/21 [ColonoscopyDate] : 08/18 [ColonoscopyComments] : due 8/21 [FreeTextEntry3] : her  passsed in May 2021

## 2021-12-01 ENCOUNTER — RX RENEWAL (OUTPATIENT)
Age: 66
End: 2021-12-01

## 2021-12-08 ENCOUNTER — APPOINTMENT (OUTPATIENT)
Dept: GASTROENTEROLOGY | Facility: HOSPITAL | Age: 66
End: 2021-12-08

## 2021-12-08 ENCOUNTER — TRANSCRIPTION ENCOUNTER (OUTPATIENT)
Age: 66
End: 2021-12-08

## 2021-12-10 ENCOUNTER — NON-APPOINTMENT (OUTPATIENT)
Age: 66
End: 2021-12-10

## 2021-12-16 ENCOUNTER — RESULT REVIEW (OUTPATIENT)
Age: 66
End: 2021-12-16

## 2021-12-16 ENCOUNTER — APPOINTMENT (OUTPATIENT)
Dept: HEMATOLOGY ONCOLOGY | Facility: CLINIC | Age: 66
End: 2021-12-16
Payer: MEDICARE

## 2021-12-16 ENCOUNTER — APPOINTMENT (OUTPATIENT)
Dept: INTERNAL MEDICINE | Facility: CLINIC | Age: 66
End: 2021-12-16
Payer: MEDICARE

## 2021-12-16 VITALS
OXYGEN SATURATION: 99 % | SYSTOLIC BLOOD PRESSURE: 112 MMHG | HEART RATE: 98 BPM | BODY MASS INDEX: 16.13 KG/M2 | TEMPERATURE: 98.3 F | RESPIRATION RATE: 16 BRPM | WEIGHT: 100.38 LBS | HEIGHT: 66 IN | DIASTOLIC BLOOD PRESSURE: 77 MMHG

## 2021-12-16 VITALS
TEMPERATURE: 98.5 F | BODY MASS INDEX: 16.39 KG/M2 | SYSTOLIC BLOOD PRESSURE: 92 MMHG | WEIGHT: 102 LBS | DIASTOLIC BLOOD PRESSURE: 58 MMHG | HEIGHT: 66 IN

## 2021-12-16 PROCEDURE — 99406 BEHAV CHNG SMOKING 3-10 MIN: CPT

## 2021-12-16 PROCEDURE — 36415 COLL VENOUS BLD VENIPUNCTURE: CPT

## 2021-12-16 PROCEDURE — 99496 TRANSJ CARE MGMT HIGH F2F 7D: CPT | Mod: 25

## 2021-12-16 PROCEDURE — 99214 OFFICE O/P EST MOD 30 MIN: CPT | Mod: 25

## 2021-12-16 RX ORDER — RAMIPRIL 2.5 MG/1
2.5 CAPSULE ORAL
Refills: 0 | Status: DISCONTINUED | COMMUNITY
End: 2021-12-16

## 2021-12-16 RX ORDER — APIXABAN 5 MG/1
5 TABLET, FILM COATED ORAL
Qty: 180 | Refills: 1 | Status: DISCONTINUED | COMMUNITY
Start: 2021-08-12 | End: 2021-12-16

## 2021-12-16 RX ORDER — FUROSEMIDE 40 MG/1
40 TABLET ORAL
Refills: 0 | Status: DISCONTINUED | COMMUNITY
End: 2021-12-16

## 2021-12-16 RX ORDER — ATORVASTATIN CALCIUM 40 MG/1
40 TABLET, FILM COATED ORAL DAILY
Refills: 0 | Status: DISCONTINUED | COMMUNITY
End: 2021-12-16

## 2021-12-16 NOTE — HISTORY OF PRESENT ILLNESS
[Post-hospitalization from ___ Hospital] : Post-hospitalization from [unfilled] Hospital [Admitted on: ___] : The patient was admitted on [unfilled] [Discharged on ___] : discharged on [unfilled] [Discharge Summary] : discharge summary [Pertinent Labs] : pertinent labs [Radiology Findings] : radiology findings [Discharge Med List] : discharge medication list [Med Reconciliation] : medication reconciliation has been completed [Patient Contacted By: ____] : and contacted by [unfilled] [FreeTextEntry2] : Pt had called stating that she had a lot of blood when having a BM. She was on Eliquis and Asprin. We told her to go to the ER, and she was admitted to ICU for 2 days and got 2 units of PRBC. She felt well when discharged, but last night had cramps in the legs and hands. She is feeling weak today. She has been trying to eat and hydrating well. \par She lives at home alone. She has friends and a brother that helps her. \par \par Pt says she feels that she was doing well with her health until she was diagnosed with Lyme about 4 years ago. Her  passed this year 5/21 from prostate cancer.  She says she never tested positive for COVID but had antibodies. She was adm to the hospital in 11/20 with a  COVID??, CVA, and had some mild residual right arm weakness. Now improved. At that time she did not want to be on any meds. She was again admitted on 10/21 and had cardiac cath and was diagnosed with 3 VD. She still has not had intervention as she says she saw different cardio from [facility] and [facility] and their recommendations are different. She will however be following with CT surgeon from [facility] and has agreed to CABG.  Unfortunately she cannot be on Eliquis or Advil at present and unfortunately she has started to smoke again.

## 2021-12-16 NOTE — COUNSELING
[Yes] : Risk of tobacco use and health benefits of smoking cessation discussed: Yes [Cessation strategies including cessation program discussed] : Cessation strategies including cessation program discussed [Use of nicotine replacement therapies and other medications discussed] : Use of nicotine replacement therapies and other medications discussed [Encouraged to pick a quit date and identify support needed to quit] : Encouraged to pick a quit date and identify support needed to quit [No] : Not willing to quit smoking [FreeTextEntry2] : Given all of her medical conditions pt is aware that smoking contributes to these. Strongly advised to quit which she did, but due to stress has restarted.  [FreeTextEntry1] : 5

## 2021-12-16 NOTE — REVIEW OF SYSTEMS
[Fatigue] : fatigue [Dyspnea on Exertion] : dyspnea on exertion [Frequency] : frequency [Easy Bleeding] : easy bleeding [Fever] : no fever [Chest Pain] : no chest pain [Shortness Of Breath] : no shortness of breath [Wheezing] : no wheezing [Cough] : no cough [Muscle Weakness] : no muscle weakness [FreeTextEntry6] : no SOB at rest but on exertion [FreeTextEntry7] : no bleeding since hospitalization [FreeTextEntry8] : frequency since starting on spironolactone [FreeTextEntry9] : cramping last night [de-identified] : stressed

## 2021-12-16 NOTE — HEALTH RISK ASSESSMENT
[Current] : Current [No] : In the past 12 months have you used drugs other than those required for medical reasons? No [0] : 2) Feeling down, depressed, or hopeless: Not at all (0) [Patient reported mammogram was normal] : Patient reported mammogram was normal [Patient reported PAP Smear was normal] : Patient reported PAP Smear was normal [Patient reported colonoscopy was abnormal] : Patient reported colonoscopy was abnormal [HIV test declined] : HIV test declined [Hepatitis C test declined] : Hepatitis C test declined [Alone] : lives alone [Retired] : retired [] :  [Feels Safe at Home] : Feels safe at home [Fully functional (bathing, dressing, toileting, transferring, walking, feeding)] : Fully functional (bathing, dressing, toileting, transferring, walking, feeding) [Fully functional (using the telephone, shopping, preparing meals, housekeeping, doing laundry, using] : Fully functional and needs no help or supervision to perform IADLs (using the telephone, shopping, preparing meals, housekeeping, doing laundry, using transportation, managing medications and managing finances) [de-identified] : none [de-identified] : regular diet  [MammogramDate] : 10/19 [PapSmearDate] : 09/20 [ColonoscopyDate] : 08/18 [ColonoscopyComments] : due 8/21 [FreeTextEntry2] :

## 2021-12-16 NOTE — PHYSICAL EXAM
[Normal] : no acute distress, well nourished, well developed and well-appearing [No JVD] : no jugular venous distention [No Respiratory Distress] : no respiratory distress  [No Accessory Muscle Use] : no accessory muscle use [Clear to Auscultation] : lungs were clear to auscultation bilaterally [Normal Percussion] : the chest was normal to percussion [Normal Rate] : normal rate  [Regular Rhythm] : with a regular rhythm [Normal S1, S2] : normal S1 and S2 [No Edema] : there was no peripheral edema [Normal Affect] : the affect was normal [Normal Insight/Judgement] : insight and judgment were intact [Coordination Grossly Intact] : coordination grossly intact [No Focal Deficits] : no focal deficits [Normal Gait] : normal gait [Alert and Oriented x3] : oriented to person, place, and time [Normal Mood] : the mood was normal [59059 - High Complexity requires an extensive number of possible diagnoses and/or the management options, extensive complexity of the medical data (tests, etc.) to be reviewed, and a high risk of significant complications, morbidity, and/or mortality as w] : High Complexity  [de-identified] : I/VI systolic murmur

## 2021-12-17 NOTE — HISTORY OF PRESENT ILLNESS
[de-identified] : Ms. Shiloh Ruelas is 65 year old female with thrombocytosis and CVA here for consultation\par \par Patient is current smoker with history of Nance's esophagus, osteoporosis, HTN, hypercholesterolemia, hyperlipidemia, lyme disease about 5 years ago with extreme exhaustion.  Patient reports severe exhaustion, can fall asleep at the moment if she's laying down. Exhaustion started around Oct 2020, hospitalized at [facility] with positive Covid antibodies  and was told that she had 'Covid stroke".  She's discharged home with Eliquis 5mg bid. She's scheduled for colonoscopy with Dr. Heredia this Monday 9/27/21.  \par \par 8/4/21 wbc 6.63 Hgb 13.0 hct 39.3 plts 420  (normal prior to Feb 2021)\par \par FHx: Mother with cervical cancer in his 60s,  P. aunt with colon cancer in his early 60\par SHx: Former smoker - smoked all her adulthood, quit for six month, resumed from 5- August 2021. \par \par Mammogram was in March 2021. \par She received her Carlton and Carlton shot in April 2021. \par She feels her heart race with Eliquis\par She lives alone since her  passed away in May 2021 from prostate cancer. \par  [de-identified] : She is seen today for follow up\par \par She was recently at [facility] on 12/9/21 for GI bleed with Hgb 7.9. Received 2 units of PRBCs with EGD / colonoscopy :  Barretts / diverticulosis / hemorrhoids. Her D dimer was normalized and discharged home with instruction to hold Eliquis. \par She's seeing cardiac surgeon next Tuesday for possible bypass. \par Patient refused to be on anymore anticoagulant. \par overall doing better. \par
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
## 2021-12-30 ENCOUNTER — NON-APPOINTMENT (OUTPATIENT)
Age: 66
End: 2021-12-30

## 2022-01-05 ENCOUNTER — APPOINTMENT (OUTPATIENT)
Dept: GASTROENTEROLOGY | Facility: CLINIC | Age: 67
End: 2022-01-05
Payer: MEDICARE

## 2022-01-05 VITALS
OXYGEN SATURATION: 95 % | HEIGHT: 66 IN | DIASTOLIC BLOOD PRESSURE: 70 MMHG | WEIGHT: 97 LBS | TEMPERATURE: 97.7 F | BODY MASS INDEX: 15.59 KG/M2 | HEART RATE: 94 BPM | SYSTOLIC BLOOD PRESSURE: 124 MMHG

## 2022-01-05 PROCEDURE — 99214 OFFICE O/P EST MOD 30 MIN: CPT

## 2022-01-05 RX ORDER — METOPROLOL SUCCINATE 25 MG/1
25 TABLET, EXTENDED RELEASE ORAL
Refills: 0 | Status: DISCONTINUED | COMMUNITY
End: 2022-01-05

## 2022-01-05 RX ORDER — CARVEDILOL 6.25 MG/1
6.25 TABLET, FILM COATED ORAL
Qty: 60 | Refills: 0 | Status: DISCONTINUED | COMMUNITY
Start: 2021-10-12 | End: 2022-01-05

## 2022-01-05 RX ORDER — FUROSEMIDE 20 MG/1
20 TABLET ORAL
Qty: 30 | Refills: 0 | Status: DISCONTINUED | COMMUNITY
Start: 2021-10-12 | End: 2022-01-05

## 2022-01-05 RX ORDER — CEFUROXIME AXETIL 500 MG/1
500 TABLET ORAL
Qty: 5 | Refills: 0 | Status: DISCONTINUED | COMMUNITY
Start: 2021-10-12 | End: 2022-01-05

## 2022-01-05 RX ORDER — SACUBITRIL AND VALSARTAN 24; 26 MG/1; MG/1
24-26 TABLET, FILM COATED ORAL TWICE DAILY
Refills: 0 | Status: DISCONTINUED | COMMUNITY
End: 2022-01-05

## 2022-01-05 RX ORDER — ASPIRIN 81 MG/1
81 TABLET, CHEWABLE ORAL
Qty: 30 | Refills: 0 | Status: DISCONTINUED | COMMUNITY
Start: 2021-10-12 | End: 2022-01-05

## 2022-01-05 NOTE — PHYSICAL EXAM
[General Appearance - Alert] : alert [General Appearance - In No Acute Distress] : in no acute distress [Sclera] : the sclera and conjunctiva were normal [Outer Ear] : the ears and nose were normal in appearance [Neck Appearance] : the appearance of the neck was normal [] : no respiratory distress [Abdomen Soft] : soft [Abnormal Walk] : normal gait [Skin Color & Pigmentation] : normal skin color and pigmentation [Oriented To Time, Place, And Person] : oriented to person, place, and time [No Focal Deficits] : no focal deficits

## 2022-01-05 NOTE — HISTORY OF PRESENT ILLNESS
[de-identified] : Presents  for follow up after 12/2021 hospitalization for GI bleed. No further bleeding EGD revealed Barretts / colonoscopy revealed hemorrhoids and  diverticulosis.  [place] was self  limited diverticular  bleed  \par \par Previously evaluated  for  h/o Barretts / abnormal CT scan / history of  colon polyp. Patient  continued  to smoke  despite  counselling to quit.  Scheduled EGD  / colonoscopy 8/2021 reviewed and  was notable  for Barretts  without  dysplasia / hyperplastic  colon polyp / hemorrhoids and  diverticulosis\par \par In [facility]  ED  11/2020 with confusion / elevated WBC  and  CT with gastric  antral wall thickening / diverticulosis / gallstones / nodular liver  ( spoke  with Dr. Briceño who had  not seen patient  at the time  of his  call to me.  He was called as a GI consult) .  Apparently w/u revealed  a CVA  / positive  COVID ab. No EGD performed as per patient. [facility]\par \par  EGD / colonoscopy in 11/2014 confirmed Barretts esophagus and revealed hemorrhoids /diverticulosis and a hyperplastic polyp. Repeat colonoscopy recommended in 5 years / EGD in 1 yr. \par

## 2022-01-05 NOTE — ASSESSMENT
[FreeTextEntry1] : 1.Barretts:  smoking cessation once  again encouraged.  Patient  understands  increased risk of  esophageal cancer ( as well as progression of her coronary disease/ PPI encouraged...patient  only willing to take  TUMs/ EGD in 2  year\par \par 2.History of  colon polyps:  Colonoscopy 8/2026\par \par 3.Weight loss: Stable Would  consider chest CT given long history of  smoking and  unremarkable  EGD / colonoscopy\par \par

## 2022-03-29 ENCOUNTER — RESULT REVIEW (OUTPATIENT)
Age: 67
End: 2022-03-29

## 2022-05-18 ENCOUNTER — NON-APPOINTMENT (OUTPATIENT)
Age: 67
End: 2022-05-18

## 2022-06-02 ENCOUNTER — APPOINTMENT (OUTPATIENT)
Dept: INTERNAL MEDICINE | Facility: CLINIC | Age: 67
End: 2022-06-02
Payer: MEDICARE

## 2022-06-02 VITALS
HEIGHT: 66 IN | WEIGHT: 99 LBS | HEART RATE: 88 BPM | SYSTOLIC BLOOD PRESSURE: 136 MMHG | OXYGEN SATURATION: 96 % | BODY MASS INDEX: 15.91 KG/M2 | DIASTOLIC BLOOD PRESSURE: 82 MMHG | TEMPERATURE: 99.1 F

## 2022-06-02 DIAGNOSIS — Z86.19 PERSONAL HISTORY OF OTHER INFECTIOUS AND PARASITIC DISEASES: ICD-10-CM

## 2022-06-02 DIAGNOSIS — R79.82 ELEVATED C-REACTIVE PROTEIN (CRP): ICD-10-CM

## 2022-06-02 DIAGNOSIS — Z87.19 PERSONAL HISTORY OF OTHER DISEASES OF THE DIGESTIVE SYSTEM: ICD-10-CM

## 2022-06-02 DIAGNOSIS — Z86.79 PERSONAL HISTORY OF OTHER DISEASES OF THE CIRCULATORY SYSTEM: ICD-10-CM

## 2022-06-02 DIAGNOSIS — R79.89 OTHER SPECIFIED ABNORMAL FINDINGS OF BLOOD CHEMISTRY: ICD-10-CM

## 2022-06-02 DIAGNOSIS — R25.2 CRAMP AND SPASM: ICD-10-CM

## 2022-06-02 DIAGNOSIS — K22.70 BARRETT'S ESOPHAGUS W/OUT DYSPLASIA: ICD-10-CM

## 2022-06-02 DIAGNOSIS — Z12.2 ENCOUNTER FOR SCREENING FOR MALIGNANT NEOPLASM OF RESPIRATORY ORGANS: ICD-10-CM

## 2022-06-02 DIAGNOSIS — R70.0 ELEVATED ERYTHROCYTE SEDIMENTATION RATE: ICD-10-CM

## 2022-06-02 DIAGNOSIS — Z92.89 PERSONAL HISTORY OF OTHER MEDICAL TREATMENT: ICD-10-CM

## 2022-06-02 PROCEDURE — 99215 OFFICE O/P EST HI 40 MIN: CPT

## 2022-06-02 RX ORDER — NITROGLYCERIN 0.4 MG/1
0.4 TABLET SUBLINGUAL
Refills: 0 | Status: ACTIVE | COMMUNITY

## 2022-06-02 RX ORDER — SPIRONOLACTONE 25 MG/1
25 TABLET ORAL
Qty: 90 | Refills: 1 | Status: DISCONTINUED | COMMUNITY
End: 2022-06-02

## 2022-06-02 NOTE — HEALTH RISK ASSESSMENT
[Patient reported mammogram was normal] : Patient reported mammogram was normal [Patient reported PAP Smear was normal] : Patient reported PAP Smear was normal [Patient reported bone density results were normal] : Patient reported bone density results were normal [Patient reported colonoscopy was abnormal] : Patient reported colonoscopy was abnormal [HIV test declined] : HIV test declined [Hepatitis C test declined] : Hepatitis C test declined [Alone] : lives alone [Retired] : retired [] :  [# Of Children ___] : has [unfilled] children [Feels Safe at Home] : Feels safe at home [Fully functional (bathing, dressing, toileting, transferring, walking, feeding)] : Fully functional (bathing, dressing, toileting, transferring, walking, feeding) [Fully functional (using the telephone, shopping, preparing meals, housekeeping, doing laundry, using] : Fully functional and needs no help or supervision to perform IADLs (using the telephone, shopping, preparing meals, housekeeping, doing laundry, using transportation, managing medications and managing finances) [Never] : Never [No] : No [No falls in past year] : Patient reported no falls in the past year [0] : 2) Feeling down, depressed, or hopeless: Not at all (0) [PHQ-2 Negative - No further assessment needed] : PHQ-2 Negative - No further assessment needed [[id number]] : 0 [MammogramDate] : 03/22 [PapSmearDate] : 09/20 [BoneDensityDate] : 03/21 [ColonoscopyDate] : 12/21 [ColonoscopyComments] : due in 2026 [FreeTextEntry2] :

## 2022-06-02 NOTE — HISTORY OF PRESENT ILLNESS
[FreeTextEntry1] : follow up visit [de-identified] : Pt last visit here was 1/2021. She was in the [facility] in [place]. She was admitted from 5/13-5/14 and had stents placed in her heart. She had a total of 4 stents placed. She is now compliant with statin, plavix, metoprolol and aspirin. She will be stopping aspirin on 6/13/22.\par Pt says that she feels the same after the stents. Her issue has been SOB, never chest pain.\par She says that metoprolol does not agree with her. She take 12.5mg of it and it makes her feel a way that she cannot describe. \par Pt has had COVId illness and had the J&J vaccine. no booster. \par Pt says she was in perfectly good health until she had COVID and then a stroke after COVID. She also ha LYyme at that time. She was no meds before that, only vitamins.\par She has been on symbicort since COVID and it helps to open her airway

## 2022-06-02 NOTE — REVIEW OF SYSTEMS
[Shortness Of Breath] : shortness of breath [Dyspnea on Exertion] : dyspnea on exertion [Negative] : Heme/Lymph [Fever] : no fever [Fatigue] : no fatigue [Chest Pain] : no chest pain [Wheezing] : no wheezing [Cough] : no cough

## 2022-06-02 NOTE — PHYSICAL EXAM
[Normal] : no acute distress, well nourished, well developed and well-appearing [No JVD] : no jugular venous distention [Supple] : supple [No Respiratory Distress] : no respiratory distress  [No Accessory Muscle Use] : no accessory muscle use [Clear to Auscultation] : lungs were clear to auscultation bilaterally [Normal Percussion] : the chest was normal to percussion [Normal Rate] : normal rate  [Regular Rhythm] : with a regular rhythm [Normal S1, S2] : normal S1 and S2 [No Edema] : there was no peripheral edema [Coordination Grossly Intact] : coordination grossly intact [No Focal Deficits] : no focal deficits [Normal Gait] : normal gait [Speech Grossly Normal] : speech grossly normal [Normal Affect] : the affect was normal [Alert and Oriented x3] : oriented to person, place, and time [Normal Mood] : the mood was normal [Normal Insight/Judgement] : insight and judgment were intact [de-identified] : I/VI systolic murmur

## 2022-06-15 ENCOUNTER — HOSPITAL ENCOUNTER (OUTPATIENT)
Dept: HOSPITAL 74 - FASU | Age: 67
Discharge: HOME | End: 2022-06-15
Attending: OPHTHALMOLOGY
Payer: COMMERCIAL

## 2022-06-15 VITALS — DIASTOLIC BLOOD PRESSURE: 93 MMHG | SYSTOLIC BLOOD PRESSURE: 130 MMHG | HEART RATE: 80 BPM

## 2022-06-15 VITALS — BODY MASS INDEX: 16.6 KG/M2

## 2022-06-15 VITALS — TEMPERATURE: 97.6 F

## 2022-06-15 DIAGNOSIS — H26.8: Primary | ICD-10-CM

## 2022-06-15 PROCEDURE — 08RJ3JZ REPLACEMENT OF RIGHT LENS WITH SYNTHETIC SUBSTITUTE, PERCUTANEOUS APPROACH: ICD-10-PCS | Performed by: OPHTHALMOLOGY

## 2022-06-15 PROCEDURE — 66984 XCAPSL CTRC RMVL W/O ECP: CPT

## 2022-06-15 RX ADMIN — CIPROFLOXACIN HYDROCHLORIDE ONE DROP: 3 SOLUTION/ DROPS OPHTHALMIC at 08:40

## 2022-06-15 RX ADMIN — CIPROFLOXACIN HYDROCHLORIDE ONE DROP: 3 SOLUTION/ DROPS OPHTHALMIC at 08:35

## 2022-06-15 RX ADMIN — CYCLOPENTOLATE HYDROCHLORIDE ONE DROP: 20 SOLUTION/ DROPS OPHTHALMIC at 08:40

## 2022-06-15 RX ADMIN — CYCLOPENTOLATE HYDROCHLORIDE ONE DROP: 20 SOLUTION/ DROPS OPHTHALMIC at 08:45

## 2022-06-15 RX ADMIN — TROPICAMIDE ONE DROP: 10 SOLUTION/ DROPS OPHTHALMIC at 08:45

## 2022-06-15 RX ADMIN — TROPICAMIDE ONE DROP: 10 SOLUTION/ DROPS OPHTHALMIC at 08:40

## 2022-06-15 RX ADMIN — TROPICAMIDE ONE DROP: 10 SOLUTION/ DROPS OPHTHALMIC at 08:35

## 2022-06-15 RX ADMIN — PHENYLEPHRINE HYDROCHLORIDE ONE DROP: 0.25 SPRAY NASAL at 08:35

## 2022-06-15 RX ADMIN — CYCLOPENTOLATE HYDROCHLORIDE ONE DROP: 20 SOLUTION/ DROPS OPHTHALMIC at 08:35

## 2022-06-15 RX ADMIN — PHENYLEPHRINE HYDROCHLORIDE ONE DROP: 0.25 SPRAY NASAL at 08:45

## 2022-06-15 RX ADMIN — PHENYLEPHRINE HYDROCHLORIDE ONE DROP: 0.25 SPRAY NASAL at 08:40

## 2022-06-15 RX ADMIN — CIPROFLOXACIN HYDROCHLORIDE ONE DROP: 3 SOLUTION/ DROPS OPHTHALMIC at 08:45

## 2022-09-06 ENCOUNTER — RX RENEWAL (OUTPATIENT)
Age: 67
End: 2022-09-06

## 2022-09-27 ENCOUNTER — APPOINTMENT (OUTPATIENT)
Dept: OBGYN | Facility: CLINIC | Age: 67
End: 2022-09-27

## 2022-09-27 ENCOUNTER — NON-APPOINTMENT (OUTPATIENT)
Age: 67
End: 2022-09-27

## 2022-09-27 VITALS
WEIGHT: 102 LBS | HEIGHT: 66 IN | DIASTOLIC BLOOD PRESSURE: 90 MMHG | SYSTOLIC BLOOD PRESSURE: 150 MMHG | BODY MASS INDEX: 16.39 KG/M2

## 2022-09-27 DIAGNOSIS — F17.200 NICOTINE DEPENDENCE, UNSPECIFIED, UNCOMPLICATED: ICD-10-CM

## 2022-09-27 PROCEDURE — G0101: CPT

## 2022-10-03 LAB
CYTOLOGY CVX/VAG DOC THIN PREP: ABNORMAL
HPV HIGH+LOW RISK DNA PNL CVX: NOT DETECTED

## 2022-10-05 ENCOUNTER — APPOINTMENT (OUTPATIENT)
Dept: INTERNAL MEDICINE | Facility: CLINIC | Age: 67
End: 2022-10-05

## 2022-10-05 ENCOUNTER — LABORATORY RESULT (OUTPATIENT)
Age: 67
End: 2022-10-05

## 2022-10-05 VITALS
WEIGHT: 102 LBS | SYSTOLIC BLOOD PRESSURE: 124 MMHG | HEART RATE: 78 BPM | BODY MASS INDEX: 16.39 KG/M2 | DIASTOLIC BLOOD PRESSURE: 70 MMHG | HEIGHT: 66 IN | OXYGEN SATURATION: 98 %

## 2022-10-05 PROCEDURE — G0439: CPT

## 2022-10-05 PROCEDURE — 99213 OFFICE O/P EST LOW 20 MIN: CPT | Mod: 25

## 2022-10-05 PROCEDURE — G0402 INITIAL PREVENTIVE EXAM: CPT

## 2022-10-05 PROCEDURE — 36415 COLL VENOUS BLD VENIPUNCTURE: CPT

## 2022-10-05 PROCEDURE — G0444 DEPRESSION SCREEN ANNUAL: CPT | Mod: 59

## 2022-10-05 RX ORDER — METOPROLOL SUCCINATE 25 MG/1
25 TABLET, EXTENDED RELEASE ORAL
Qty: 45 | Refills: 3 | Status: DISCONTINUED | COMMUNITY
End: 2022-10-05

## 2022-10-05 RX ORDER — ATORVASTATIN CALCIUM 40 MG/1
40 TABLET, FILM COATED ORAL
Refills: 0 | Status: DISCONTINUED | COMMUNITY
End: 2022-10-05

## 2022-10-05 NOTE — PHYSICAL EXAM
[Normal] : no acute distress, well nourished, well developed and well-appearing [No JVD] : no jugular venous distention [Supple] : supple [No Respiratory Distress] : no respiratory distress  [No Accessory Muscle Use] : no accessory muscle use [Clear to Auscultation] : lungs were clear to auscultation bilaterally [Normal Percussion] : the chest was normal to percussion [Normal Rate] : normal rate  [Regular Rhythm] : with a regular rhythm [Normal S1, S2] : normal S1 and S2 [No Edema] : there was no peripheral edema [Normal Posterior Cervical Nodes] : no posterior cervical lymphadenopathy [Normal Anterior Cervical Nodes] : no anterior cervical lymphadenopathy [No CVA Tenderness] : no CVA  tenderness [No Spinal Tenderness] : no spinal tenderness [No Joint Swelling] : no joint swelling [Grossly Normal Strength/Tone] : grossly normal strength/tone [No Rash] : no rash [Coordination Grossly Intact] : coordination grossly intact [No Focal Deficits] : no focal deficits [Normal Gait] : normal gait [Speech Grossly Normal] : speech grossly normal [Normal Affect] : the affect was normal [Alert and Oriented x3] : oriented to person, place, and time [Normal Mood] : the mood was normal [Normal Insight/Judgement] : insight and judgment were intact [de-identified] : I/VI systolic murmur [de-identified] : done by GYN

## 2022-10-05 NOTE — HEALTH RISK ASSESSMENT
[Good] : ~his/her~  mood as  good [No falls in past year] : Patient reported no falls in the past year [0] : 2) Feeling down, depressed, or hopeless: Not at all (0) [PHQ-2 Negative - No further assessment needed] : PHQ-2 Negative - No further assessment needed [Patient reported mammogram was normal] : Patient reported mammogram was normal [Patient reported colonoscopy was abnormal] : Patient reported colonoscopy was abnormal [HIV test declined] : HIV test declined [Hepatitis C test declined] : Hepatitis C test declined [None] : None [Alone] : lives alone [Retired] : retired [] :  [# Of Children ___] : has [unfilled] children [Feels Safe at Home] : Feels safe at home [Fully functional (bathing, dressing, toileting, transferring, walking, feeding)] : Fully functional (bathing, dressing, toileting, transferring, walking, feeding) [Fully functional (using the telephone, shopping, preparing meals, housekeeping, doing laundry, using] : Fully functional and needs no help or supervision to perform IADLs (using the telephone, shopping, preparing meals, housekeeping, doing laundry, using transportation, managing medications and managing finances) [Patient reported PAP Smear was abnormal] : Patient reported PAP Smear was abnormal [Patient reported bone density results were abnormal] : Patient reported bone density results were abnormal [[id number]] : 0 [MammogramDate] : 03/22 [PapSmearDate] : 09/22 [BoneDensityDate] : 03/21 [BoneDensityComments] : osteoporosis [ColonoscopyDate] : 12/21 [ColonoscopyComments] : due in 2026 [FreeTextEntry2] :

## 2022-10-05 NOTE — HISTORY OF PRESENT ILLNESS
[PMH Reviewed and Updated] : past medical history reviewed and updated [PSH Reviewed and Updated] : past surgical history reviewed and updated [Family History Reviewed and Updated] : family history reviewed and updated [Medication and Allergies Reconciled] : medication and allergies reconciled [0] : 0 [Retired] : retired from work [Never] : has never used illicit drugs [None] : The patient does not exercise [Compliant with medications] : compliant with medications [Friends] : friends [Extended Family] : extended family [Fully Independent] : fully independent [Drives without concerns] : drives without concerns [No history of falls] : no history of falls [Seatbelts] : seatbelts [Safe Driving Habits] : safe driving habits [Smoke Detectors] : smoke detectors [Carbon Monoxide Detector] : carbon monoxide detector [Hot Water Temp <120F] : hot water temp <120F [Gun Trigger Locks] : gun trigger locks [Gun Safe] : gun safe [Sunscreen] : sunscreen [Patient Has Full Capacity] : this patient has full decision making capacity for discussion of advance care planning [Patient Has Designated Health Care Proxy/Advanced Directive] : patient has designated Health Care Proxy/Advanced Directive [FreeTextEntry1] : wellness exam  [de-identified] : Pt here for medicare annual wellness. Says still extremely fatigued. She had a stress test 2 weeks ago. She says she could not complete it due to shortness of breath. She wants to go to cardiac rehab but they require a mask and she cannot exercise with a mask. \par Pt still has issues with dyspnea on exertion since 11/2020. At that time she was admitted with CVA and COVID. She also tested positive for Lyme disease in 10/21. \par Overall she has been fatigued and SOB since 11/20 when all of this started. Her  passed away in 5/2021. \par \par She also has heart disease and follows with Dr LEIGH Stoll at [facility].  [Over the Past 2 Weeks, Have You Felt Down, Depressed, or Hopeless?] : 1.) Over the past 2 weeks, have you felt down, depressed, or hopeless? No [Over the Past 2 Weeks, Have You Felt Little Interest or Pleasure Doing Things?] : 2.) Over the past 2 weeks, have you felt little interest or pleasure doing things? No [Bicycle Helmet] : not using bicycle helmet [Motorcycle Helmet] : not using motorcycle helmet [Bathroom Grab Bars] : not using bathroom grab bars [Fall Prevention Measures] : not using fall prevention measures [CPR Training for Household] : did not receive CPR training for patient [CPR Training for Patient] : did not receive CPR training for patient [de-identified] : alone with cat  [FreeTextEntry2] : none [de-identified] : none [de-identified] : none

## 2022-10-06 LAB
ALBUMIN SERPL ELPH-MCNC: 4.5 G/DL
ALP BLD-CCNC: 95 U/L
ALT SERPL-CCNC: 17 U/L
ANION GAP SERPL CALC-SCNC: 12 MMOL/L
APPEARANCE: CLEAR
AST SERPL-CCNC: 21 U/L
BASOPHILS # BLD AUTO: 0.06 K/UL
BASOPHILS NFR BLD AUTO: 0.9 %
BILIRUB SERPL-MCNC: 0.4 MG/DL
BILIRUBIN URINE: NEGATIVE
BLOOD URINE: NEGATIVE
BUN SERPL-MCNC: 7 MG/DL
CALCIUM SERPL-MCNC: 9.9 MG/DL
CHLORIDE SERPL-SCNC: 95 MMOL/L
CHOLEST SERPL-MCNC: 195 MG/DL
CO2 SERPL-SCNC: 29 MMOL/L
COLOR: COLORLESS
CREAT SERPL-MCNC: 0.6 MG/DL
EGFR: 100 ML/MIN/1.73M2
EOSINOPHIL # BLD AUTO: 0.08 K/UL
EOSINOPHIL NFR BLD AUTO: 1.1 %
GLUCOSE QUALITATIVE U: NEGATIVE
GLUCOSE SERPL-MCNC: 94 MG/DL
HCT VFR BLD CALC: 37.1 %
HDLC SERPL-MCNC: 79 MG/DL
HGB BLD-MCNC: 12.5 G/DL
IMM GRANULOCYTES NFR BLD AUTO: 0.3 %
KETONES URINE: NEGATIVE
LDLC SERPL CALC-MCNC: 102 MG/DL
LEUKOCYTE ESTERASE URINE: NEGATIVE
LYMPHOCYTES # BLD AUTO: 1.42 K/UL
LYMPHOCYTES NFR BLD AUTO: 20.2 %
MAN DIFF?: NORMAL
MCHC RBC-ENTMCNC: 29.7 PG
MCHC RBC-ENTMCNC: 33.7 GM/DL
MCV RBC AUTO: 88.1 FL
MONOCYTES # BLD AUTO: 0.72 K/UL
MONOCYTES NFR BLD AUTO: 10.3 %
NEUTROPHILS # BLD AUTO: 4.72 K/UL
NEUTROPHILS NFR BLD AUTO: 67.2 %
NITRITE URINE: NEGATIVE
NONHDLC SERPL-MCNC: 116 MG/DL
PH URINE: 7.5
PLATELET # BLD AUTO: 371 K/UL
POTASSIUM SERPL-SCNC: 4.6 MMOL/L
PROT SERPL-MCNC: 7.6 G/DL
PROTEIN URINE: NEGATIVE
RBC # BLD: 4.21 M/UL
RBC # FLD: 14.6 %
SODIUM SERPL-SCNC: 135 MMOL/L
SPECIFIC GRAVITY URINE: 1.01
TRIGL SERPL-MCNC: 69 MG/DL
TSH SERPL-ACNC: 0.67 UIU/ML
UROBILINOGEN URINE: NORMAL
WBC # FLD AUTO: 7.02 K/UL

## 2022-10-07 RX ORDER — ALBUTEROL SULFATE 90 UG/1
108 (90 BASE) INHALANT RESPIRATORY (INHALATION)
Qty: 1 | Refills: 3 | Status: ACTIVE | COMMUNITY
Start: 2022-06-02 | End: 1900-01-01

## 2022-10-14 ENCOUNTER — APPOINTMENT (OUTPATIENT)
Dept: INTERNAL MEDICINE | Facility: CLINIC | Age: 67
End: 2022-10-14

## 2022-10-14 PROCEDURE — 99442: CPT | Mod: CS,95

## 2022-10-14 NOTE — HISTORY OF PRESENT ILLNESS
[Home] : at home, [unfilled] , at the time of the visit. [Medical Office: ([facility])___] : at the medical office located in  [Verbal consent obtained from patient] : the patient, [unfilled] [FreeTextEntry8] : Pt tested positive for COVID this AM. She is very exhausted. Tmax today 102.4, took Tylenol and felt better. Currently taking a homeopathic remedy. No sore throat or cough currently. Main symptom is fatigue. \par No SOB out of her ordinary. No chest pressure

## 2022-10-14 NOTE — REVIEW OF SYSTEMS
[Fever] : fever [Fatigue] : fatigue [Sore Throat] : no sore throat [Cough] : no cough [FreeTextEntry2] : Tmax 102.4 [FreeTextEntry6] : no unusual SOB

## 2022-12-08 RX ORDER — NIRMATRELVIR AND RITONAVIR 150-100 MG
10 X 150 MG & KIT ORAL
Qty: 20 | Refills: 0 | Status: DISCONTINUED | COMMUNITY
Start: 2022-10-14 | End: 2022-12-08

## 2022-12-08 RX ORDER — CLOPIDOGREL BISULFATE 75 MG/1
75 TABLET, FILM COATED ORAL
Refills: 0 | Status: ACTIVE | COMMUNITY

## 2022-12-29 ENCOUNTER — APPOINTMENT (OUTPATIENT)
Dept: INTERNAL MEDICINE | Facility: CLINIC | Age: 67
End: 2022-12-29
Payer: MEDICARE

## 2022-12-29 VITALS
SYSTOLIC BLOOD PRESSURE: 136 MMHG | OXYGEN SATURATION: 96 % | WEIGHT: 100 LBS | BODY MASS INDEX: 16.07 KG/M2 | HEIGHT: 66 IN | HEART RATE: 94 BPM | DIASTOLIC BLOOD PRESSURE: 74 MMHG

## 2022-12-29 DIAGNOSIS — U07.1 COVID-19: ICD-10-CM

## 2022-12-29 PROCEDURE — 99214 OFFICE O/P EST MOD 30 MIN: CPT

## 2022-12-29 RX ORDER — PRAVASTATIN SODIUM 20 MG/1
20 TABLET ORAL
Refills: 0 | Status: DISCONTINUED | COMMUNITY
End: 2022-12-29

## 2022-12-29 NOTE — PHYSICAL EXAM
[No Acute Distress] : no acute distress [Well-Appearing] : well-appearing [No JVD] : no jugular venous distention [No Respiratory Distress] : no respiratory distress  [No Accessory Muscle Use] : no accessory muscle use [Clear to Auscultation] : lungs were clear to auscultation bilaterally [Normal Rate] : normal rate  [Regular Rhythm] : with a regular rhythm [Normal S1, S2] : normal S1 and S2 [No Edema] : there was no peripheral edema [Normal] : normal gait, coordination grossly intact, no focal deficits and deep tendon reflexes were 2+ and symmetric [Normal Affect] : the affect was normal [Normal Insight/Judgement] : insight and judgment were intact [de-identified] : fair breath sounds b/l [de-identified] : I/VI systolic murmur

## 2022-12-29 NOTE — HEALTH RISK ASSESSMENT
[No falls in past year] : Patient reported no falls in the past year [0] : 1) Little interest or pleasure doing things: Not at all (0) [1] : 2) Feeling down, depressed, or hopeless for several days (1) [PHQ-2 Negative - No further assessment needed] : PHQ-2 Negative - No further assessment needed [[id number]] : 1

## 2022-12-29 NOTE — HISTORY OF PRESENT ILLNESS
[FreeTextEntry1] : follow up visit. [de-identified] : Pt was in [facility] in November for COPD exacerbation. She was then d/c/d to [place] on 11/22 until 12/3/22. She lives at home alone. She is driving now. She feels comfortable driving. She is doing all of her ADl's. \par She followed with her cardiologist on 12/14/22. \par She is no longer smoking since 10/22. \par Currently her breathing is fine. She cannot overly exert herself or she will become SOB. \par Pt has tried many different cholesterol meds and blood pressure meds but says she cannot tolerate them. \par She does not want to try repatha.

## 2022-12-29 NOTE — REVIEW OF SYSTEMS
[Fatigue] : fatigue [Dyspnea on Exertion] : dyspnea on exertion [Negative] : Heme/Lymph [Fever] : no fever [Chest Pain] : no chest pain [Palpitations] : no palpitations [Shortness Of Breath] : no shortness of breath [Wheezing] : no wheezing [Cough] : no cough

## 2023-02-19 ENCOUNTER — RX RENEWAL (OUTPATIENT)
Age: 68
End: 2023-02-19

## 2023-03-05 ENCOUNTER — RX RENEWAL (OUTPATIENT)
Age: 68
End: 2023-03-05

## 2023-03-30 ENCOUNTER — RESULT REVIEW (OUTPATIENT)
Age: 68
End: 2023-03-30

## 2023-04-12 ENCOUNTER — APPOINTMENT (OUTPATIENT)
Dept: INTERNAL MEDICINE | Facility: CLINIC | Age: 68
End: 2023-04-12
Payer: MEDICARE

## 2023-04-12 VITALS
WEIGHT: 98 LBS | OXYGEN SATURATION: 98 % | HEART RATE: 82 BPM | DIASTOLIC BLOOD PRESSURE: 86 MMHG | HEIGHT: 66 IN | SYSTOLIC BLOOD PRESSURE: 138 MMHG | BODY MASS INDEX: 15.75 KG/M2

## 2023-04-12 DIAGNOSIS — Z87.891 PERSONAL HISTORY OF NICOTINE DEPENDENCE: ICD-10-CM

## 2023-04-12 PROCEDURE — 99214 OFFICE O/P EST MOD 30 MIN: CPT | Mod: 25

## 2023-04-12 PROCEDURE — 36415 COLL VENOUS BLD VENIPUNCTURE: CPT

## 2023-04-12 RX ORDER — BUDESONIDE AND FORMOTEROL FUMARATE DIHYDRATE 160; 4.5 UG/1; UG/1
160-4.5 AEROSOL RESPIRATORY (INHALATION)
Qty: 3 | Refills: 1 | Status: DISCONTINUED | COMMUNITY
Start: 2021-11-04 | End: 2023-04-12

## 2023-04-12 RX ORDER — VITAMIN B COMPLEX 100; 2; 100; 2; 2 MG/ML; MG/ML; MG/ML; MG/ML; MG/ML
INJECTION INTRAMUSCULAR; INTRAVENOUS
Refills: 0 | Status: DISCONTINUED | COMMUNITY
End: 2023-04-12

## 2023-04-12 NOTE — HISTORY OF PRESENT ILLNESS
[FreeTextEntry1] : follow-up  [de-identified] : Pt here for regular follow up. She followed with her cardiologist this week. She has an upcoming stress test and echo and then follow up on 5/18/23. No current chest pains. She uses her inhalers\par She is feeling well overall. She has not smoked since October, using nicotine supplement gum. \par She has been trying to eat but says she has no appetite as usual. \par She had her dexa and her mammo done. She has osteoporosis but says she cannot take the bisphosphonates and has tried prolia and it did not agree with her.

## 2023-04-12 NOTE — PHYSICAL EXAM
[No JVD] : no jugular venous distention [Normal Rate] : normal rate  [Regular Rhythm] : with a regular rhythm [No Edema] : there was no peripheral edema [Normal] : affect was normal and insight and judgment were intact [de-identified] : thin  [de-identified] : very soft systolic murmur

## 2023-04-12 NOTE — HEALTH RISK ASSESSMENT
[No] : In the past 12 months have you used drugs other than those required for medical reasons? No [No falls in past year] : Patient reported no falls in the past year [0] : 2) Feeling down, depressed, or hopeless: Not at all (0) [Former] : Former [Patient reported mammogram was normal] : Patient reported mammogram was normal [Patient reported PAP Smear was abnormal] : Patient reported PAP Smear was abnormal [Patient reported bone density results were abnormal] : Patient reported bone density results were abnormal [Patient reported colonoscopy was abnormal] : Patient reported colonoscopy was abnormal [HIV test declined] : HIV test declined [Hepatitis C test declined] : Hepatitis C test declined [None] : None [Alone] : lives alone [Retired] : retired [] :  [# Of Children ___] : has [unfilled] children [Feels Safe at Home] : Feels safe at home [Fully functional (bathing, dressing, toileting, transferring, walking, feeding)] : Fully functional (bathing, dressing, toileting, transferring, walking, feeding) [Fully functional (using the telephone, shopping, preparing meals, housekeeping, doing laundry, using] : Fully functional and needs no help or supervision to perform IADLs (using the telephone, shopping, preparing meals, housekeeping, doing laundry, using transportation, managing medications and managing finances) [de-identified] : walking  [de-identified] : regular diet  [[id number]] : 0 [MammogramDate] : 03/23 [PapSmearDate] : 09/22 [BoneDensityDate] : 03/23 [BoneDensityComments] : osteoporosis [ColonoscopyDate] : 12/21 [ColonoscopyComments] : due in 2026 [FreeTextEntry2] :

## 2023-04-14 LAB
25(OH)D3 SERPL-MCNC: 60.8 NG/ML
ALBUMIN SERPL ELPH-MCNC: 4.6 G/DL
ALP BLD-CCNC: 79 U/L
ALT SERPL-CCNC: 17 U/L
ANION GAP SERPL CALC-SCNC: 13 MMOL/L
AST SERPL-CCNC: 22 U/L
BILIRUB SERPL-MCNC: 0.4 MG/DL
BUN SERPL-MCNC: 10 MG/DL
CALCIUM SERPL-MCNC: 10.4 MG/DL
CHLORIDE SERPL-SCNC: 98 MMOL/L
CHOLEST SERPL-MCNC: 220 MG/DL
CO2 SERPL-SCNC: 30 MMOL/L
CREAT SERPL-MCNC: 0.68 MG/DL
EGFR: 96 ML/MIN/1.73M2
ESTIMATED AVERAGE GLUCOSE: 117 MG/DL
FOLATE SERPL-MCNC: >20 NG/ML
GLUCOSE SERPL-MCNC: 101 MG/DL
HBA1C MFR BLD HPLC: 5.7 %
HCT VFR BLD CALC: 40.3 %
HDLC SERPL-MCNC: 92 MG/DL
HGB BLD-MCNC: 12.9 G/DL
LDLC SERPL CALC-MCNC: 113 MG/DL
MCHC RBC-ENTMCNC: 29.5 PG
MCHC RBC-ENTMCNC: 32 GM/DL
MCV RBC AUTO: 92.2 FL
NONHDLC SERPL-MCNC: 128 MG/DL
PLATELET # BLD AUTO: 360 K/UL
POTASSIUM SERPL-SCNC: 4.6 MMOL/L
PROT SERPL-MCNC: 7.4 G/DL
RBC # BLD: 4.37 M/UL
RBC # FLD: 14.9 %
SODIUM SERPL-SCNC: 142 MMOL/L
TRIGL SERPL-MCNC: 74 MG/DL
TSH SERPL-ACNC: 0.99 UIU/ML
VIT B12 SERPL-MCNC: 648 PG/ML
WBC # FLD AUTO: 6.03 K/UL

## 2023-04-28 ENCOUNTER — RX RENEWAL (OUTPATIENT)
Age: 68
End: 2023-04-28

## 2023-05-23 ENCOUNTER — RX RENEWAL (OUTPATIENT)
Age: 68
End: 2023-05-23

## 2023-10-11 ENCOUNTER — APPOINTMENT (OUTPATIENT)
Dept: INTERNAL MEDICINE | Facility: CLINIC | Age: 68
End: 2023-10-11
Payer: MEDICARE

## 2023-10-11 VITALS
SYSTOLIC BLOOD PRESSURE: 136 MMHG | OXYGEN SATURATION: 97 % | HEIGHT: 66 IN | TEMPERATURE: 97.8 F | DIASTOLIC BLOOD PRESSURE: 74 MMHG | HEART RATE: 78 BPM | WEIGHT: 96 LBS | BODY MASS INDEX: 15.43 KG/M2

## 2023-10-11 DIAGNOSIS — E55.9 VITAMIN D DEFICIENCY, UNSPECIFIED: ICD-10-CM

## 2023-10-11 DIAGNOSIS — D64.9 ANEMIA, UNSPECIFIED: ICD-10-CM

## 2023-10-11 DIAGNOSIS — I50.9 HEART FAILURE, UNSPECIFIED: ICD-10-CM

## 2023-10-11 DIAGNOSIS — E78.00 PURE HYPERCHOLESTEROLEMIA, UNSPECIFIED: ICD-10-CM

## 2023-10-11 DIAGNOSIS — I10 ESSENTIAL (PRIMARY) HYPERTENSION: ICD-10-CM

## 2023-10-11 DIAGNOSIS — Z87.898 PERSONAL HISTORY OF OTHER SPECIFIED CONDITIONS: ICD-10-CM

## 2023-10-11 DIAGNOSIS — N95.1 MENOPAUSAL AND FEMALE CLIMACTERIC STATES: ICD-10-CM

## 2023-10-11 DIAGNOSIS — Z13.820 ENCOUNTER FOR SCREENING FOR OSTEOPOROSIS: ICD-10-CM

## 2023-10-11 DIAGNOSIS — I63.9 CEREBRAL INFARCTION, UNSPECIFIED: ICD-10-CM

## 2023-10-11 DIAGNOSIS — Z23 ENCOUNTER FOR IMMUNIZATION: ICD-10-CM

## 2023-10-11 DIAGNOSIS — J44.9 CHRONIC OBSTRUCTIVE PULMONARY DISEASE, UNSPECIFIED: ICD-10-CM

## 2023-10-11 DIAGNOSIS — R92.2 INCONCLUSIVE MAMMOGRAM: ICD-10-CM

## 2023-10-11 DIAGNOSIS — R92.30 INCONCLUSIVE MAMMOGRAM: ICD-10-CM

## 2023-10-11 DIAGNOSIS — I25.10 ATHEROSCLEROTIC HEART DISEASE OF NATIVE CORONARY ARTERY W/OUT ANGINA PECTORIS: ICD-10-CM

## 2023-10-11 PROCEDURE — 90662 IIV NO PRSV INCREASED AG IM: CPT

## 2023-10-11 PROCEDURE — G0008: CPT

## 2023-10-11 PROCEDURE — G0439: CPT

## 2023-10-11 PROCEDURE — 99213 OFFICE O/P EST LOW 20 MIN: CPT | Mod: 25

## 2023-10-11 PROCEDURE — 36415 COLL VENOUS BLD VENIPUNCTURE: CPT

## 2023-10-11 RX ORDER — NEBIVOLOL 2.5 MG/1
2.5 TABLET ORAL
Refills: 0 | Status: ACTIVE | COMMUNITY

## 2023-10-15 LAB
25(OH)D3 SERPL-MCNC: 56.9 NG/ML
ALBUMIN SERPL ELPH-MCNC: 4.7 G/DL
ALP BLD-CCNC: 85 U/L
ALT SERPL-CCNC: 14 U/L
ANION GAP SERPL CALC-SCNC: 12 MMOL/L
AST SERPL-CCNC: 20 U/L
BILIRUB SERPL-MCNC: 0.4 MG/DL
BUN SERPL-MCNC: 12 MG/DL
CALCIUM SERPL-MCNC: 9.8 MG/DL
CHLORIDE SERPL-SCNC: 97 MMOL/L
CHOLEST SERPL-MCNC: 215 MG/DL
CO2 SERPL-SCNC: 28 MMOL/L
CREAT SERPL-MCNC: 0.7 MG/DL
EGFR: 95 ML/MIN/1.73M2
ESTIMATED AVERAGE GLUCOSE: 114 MG/DL
GLUCOSE SERPL-MCNC: 91 MG/DL
HBA1C MFR BLD HPLC: 5.6 %
HCT VFR BLD CALC: 43.7 %
HDLC SERPL-MCNC: 87 MG/DL
HGB BLD-MCNC: 13.8 G/DL
LDLC SERPL CALC-MCNC: 113 MG/DL
MCHC RBC-ENTMCNC: 29.9 PG
MCHC RBC-ENTMCNC: 31.6 GM/DL
MCV RBC AUTO: 94.6 FL
NONHDLC SERPL-MCNC: 128 MG/DL
PLATELET # BLD AUTO: 346 K/UL
POTASSIUM SERPL-SCNC: 4.8 MMOL/L
PROT SERPL-MCNC: 7.4 G/DL
RBC # BLD: 4.62 M/UL
RBC # FLD: 13.7 %
SODIUM SERPL-SCNC: 137 MMOL/L
TRIGL SERPL-MCNC: 86 MG/DL
TSH SERPL-ACNC: 1.05 UIU/ML
WBC # FLD AUTO: 6.15 K/UL

## 2023-11-03 PROBLEM — Z01.419 ENCOUNTER FOR GYNECOLOGICAL EXAMINATION: Status: ACTIVE | Noted: 2019-09-16

## 2023-11-03 PROBLEM — R92.30 DENSE BREAST TISSUE: Status: ACTIVE | Noted: 2023-11-03

## 2023-11-03 PROBLEM — Z12.31 OTHER SCREENING MAMMOGRAM: Status: ACTIVE | Noted: 2022-09-27

## 2023-11-03 PROBLEM — M81.0 OSTEOPOROSIS: Status: ACTIVE | Noted: 2019-01-31

## 2023-11-07 ENCOUNTER — APPOINTMENT (OUTPATIENT)
Dept: OBGYN | Facility: CLINIC | Age: 68
End: 2023-11-07
Payer: MEDICARE

## 2023-11-07 DIAGNOSIS — R92.30 DENSE BREASTS, UNSPECIFIED: ICD-10-CM

## 2023-11-07 DIAGNOSIS — Z12.31 ENCOUNTER FOR SCREENING MAMMOGRAM FOR MALIGNANT NEOPLASM OF BREAST: ICD-10-CM

## 2023-11-07 DIAGNOSIS — R87.610 ATYPICAL SQUAMOUS CELLS OF UNDETERMINED SIGNIFICANCE ON CYTOLOGIC SMEAR OF CERVIX (ASC-US): ICD-10-CM

## 2023-11-07 DIAGNOSIS — Z01.419 ENCOUNTER FOR GYNECOLOGICAL EXAMINATION (GENERAL) (ROUTINE) W/OUT ABNORMAL FINDINGS: ICD-10-CM

## 2023-11-07 DIAGNOSIS — M81.0 AGE-RELATED OSTEOPOROSIS W/OUT CURRENT PATHOLOGICAL FRACTURE: ICD-10-CM

## 2023-11-07 PROCEDURE — G0101: CPT | Mod: GA

## 2023-11-13 LAB
CYTOLOGY CVX/VAG DOC THIN PREP: ABNORMAL
HPV HIGH+LOW RISK DNA PNL CVX: NOT DETECTED

## 2024-01-10 ENCOUNTER — RX RENEWAL (OUTPATIENT)
Age: 69
End: 2024-01-10

## 2024-01-12 RX ORDER — FLUTICASONE PROPIONATE AND SALMETEROL 250; 50 UG/1; UG/1
250-50 POWDER RESPIRATORY (INHALATION)
Qty: 1 | Refills: 3 | Status: ACTIVE | COMMUNITY
Start: 2024-01-12 | End: 1900-01-01

## 2024-01-18 RX ORDER — IPRATROPIUM BROMIDE AND ALBUTEROL SULFATE 2.5; .5 MG/3ML; MG/3ML
0.5-2.5 (3) SOLUTION RESPIRATORY (INHALATION)
Qty: 360 | Refills: 3 | Status: ACTIVE | COMMUNITY
Start: 2022-12-08 | End: 1900-01-01

## 2024-01-23 RX ORDER — BUDESONIDE AND FORMOTEROL FUMARATE DIHYDRATE 160; 4.5 UG/1; UG/1
160-4.5 AEROSOL RESPIRATORY (INHALATION)
Qty: 1 | Refills: 5 | Status: ACTIVE | COMMUNITY
Start: 2022-09-06

## 2024-04-01 ENCOUNTER — RESULT REVIEW (OUTPATIENT)
Age: 69
End: 2024-04-01

## 2024-10-17 ENCOUNTER — LABORATORY RESULT (OUTPATIENT)
Age: 69
End: 2024-10-17

## 2024-10-17 ENCOUNTER — APPOINTMENT (OUTPATIENT)
Dept: INTERNAL MEDICINE | Facility: CLINIC | Age: 69
End: 2024-10-17
Payer: MEDICARE

## 2024-10-17 VITALS
DIASTOLIC BLOOD PRESSURE: 82 MMHG | SYSTOLIC BLOOD PRESSURE: 132 MMHG | RESPIRATION RATE: 16 BRPM | HEART RATE: 84 BPM | OXYGEN SATURATION: 95 %

## 2024-10-17 VITALS
RESPIRATION RATE: 16 BRPM | BODY MASS INDEX: 16.33 KG/M2 | OXYGEN SATURATION: 93 % | HEIGHT: 65 IN | TEMPERATURE: 97.6 F | HEART RATE: 88 BPM | DIASTOLIC BLOOD PRESSURE: 90 MMHG | SYSTOLIC BLOOD PRESSURE: 150 MMHG | WEIGHT: 98 LBS

## 2024-10-17 DIAGNOSIS — J44.9 CHRONIC OBSTRUCTIVE PULMONARY DISEASE, UNSPECIFIED: ICD-10-CM

## 2024-10-17 DIAGNOSIS — Z23 ENCOUNTER FOR IMMUNIZATION: ICD-10-CM

## 2024-10-17 DIAGNOSIS — M81.0 AGE-RELATED OSTEOPOROSIS W/OUT CURRENT PATHOLOGICAL FRACTURE: ICD-10-CM

## 2024-10-17 DIAGNOSIS — N39.9 DISORDER OF URINARY SYSTEM, UNSPECIFIED: ICD-10-CM

## 2024-10-17 DIAGNOSIS — E78.00 PURE HYPERCHOLESTEROLEMIA, UNSPECIFIED: ICD-10-CM

## 2024-10-17 DIAGNOSIS — Z87.891 PERSONAL HISTORY OF NICOTINE DEPENDENCE: ICD-10-CM

## 2024-10-17 DIAGNOSIS — I50.9 HEART FAILURE, UNSPECIFIED: ICD-10-CM

## 2024-10-17 DIAGNOSIS — R53.83 OTHER FATIGUE: ICD-10-CM

## 2024-10-17 DIAGNOSIS — I10 ESSENTIAL (PRIMARY) HYPERTENSION: ICD-10-CM

## 2024-10-17 DIAGNOSIS — Z87.448 PERSONAL HISTORY OF OTHER DISEASES OF URINARY SYSTEM: ICD-10-CM

## 2024-10-17 DIAGNOSIS — D64.9 ANEMIA, UNSPECIFIED: ICD-10-CM

## 2024-10-17 DIAGNOSIS — I25.10 ATHEROSCLEROTIC HEART DISEASE OF NATIVE CORONARY ARTERY W/OUT ANGINA PECTORIS: ICD-10-CM

## 2024-10-17 PROCEDURE — G0439: CPT

## 2024-10-17 PROCEDURE — 90662 IIV NO PRSV INCREASED AG IM: CPT

## 2024-10-17 PROCEDURE — 99213 OFFICE O/P EST LOW 20 MIN: CPT | Mod: 25

## 2024-10-17 PROCEDURE — G0008: CPT

## 2024-10-17 PROCEDURE — 36415 COLL VENOUS BLD VENIPUNCTURE: CPT

## 2024-10-18 LAB
25(OH)D3 SERPL-MCNC: 40.5 NG/ML
ALBUMIN SERPL ELPH-MCNC: 4.6 G/DL
ALP BLD-CCNC: 83 U/L
ALT SERPL-CCNC: 20 U/L
ANION GAP SERPL CALC-SCNC: 15 MMOL/L
AST SERPL-CCNC: 24 U/L
BASOPHILS # BLD AUTO: 0.04 K/UL
BASOPHILS NFR BLD AUTO: 0.5 %
BILIRUB SERPL-MCNC: 0.4 MG/DL
BUN SERPL-MCNC: 12 MG/DL
CALCIUM SERPL-MCNC: 9.9 MG/DL
CHLORIDE SERPL-SCNC: 96 MMOL/L
CHOLEST SERPL-MCNC: 260 MG/DL
CO2 SERPL-SCNC: 27 MMOL/L
CREAT SERPL-MCNC: 0.65 MG/DL
EGFR: 96 ML/MIN/1.73M2
EOSINOPHIL # BLD AUTO: 0.1 K/UL
EOSINOPHIL NFR BLD AUTO: 1.3 %
ESTIMATED AVERAGE GLUCOSE: 108 MG/DL
GLUCOSE SERPL-MCNC: 93 MG/DL
HBA1C MFR BLD HPLC: 5.4 %
HCT VFR BLD CALC: 41.6 %
HDLC SERPL-MCNC: 105 MG/DL
HGB BLD-MCNC: 13.7 G/DL
IMM GRANULOCYTES NFR BLD AUTO: 0.4 %
LDLC SERPL CALC-MCNC: 143 MG/DL
LYMPHOCYTES # BLD AUTO: 1.05 K/UL
LYMPHOCYTES NFR BLD AUTO: 13.4 %
MAN DIFF?: NORMAL
MCHC RBC-ENTMCNC: 30.3 PG
MCHC RBC-ENTMCNC: 32.9 GM/DL
MCV RBC AUTO: 92 FL
MONOCYTES # BLD AUTO: 0.76 K/UL
MONOCYTES NFR BLD AUTO: 9.7 %
NEUTROPHILS # BLD AUTO: 5.88 K/UL
NEUTROPHILS NFR BLD AUTO: 74.7 %
NONHDLC SERPL-MCNC: 156 MG/DL
PLATELET # BLD AUTO: 379 K/UL
POTASSIUM SERPL-SCNC: 4.3 MMOL/L
PROT SERPL-MCNC: 7.4 G/DL
RBC # BLD: 4.52 M/UL
RBC # FLD: 13.9 %
SODIUM SERPL-SCNC: 138 MMOL/L
TRIGL SERPL-MCNC: 79 MG/DL
TSH SERPL-ACNC: 0.96 UIU/ML
WBC # FLD AUTO: 7.86 K/UL

## 2024-10-21 LAB — BACTERIA UR CULT: NORMAL

## 2024-11-11 ENCOUNTER — APPOINTMENT (OUTPATIENT)
Dept: OBGYN | Facility: CLINIC | Age: 69
End: 2024-11-11
Payer: MEDICARE

## 2024-11-11 VITALS
SYSTOLIC BLOOD PRESSURE: 140 MMHG | WEIGHT: 98 LBS | HEIGHT: 65 IN | BODY MASS INDEX: 16.33 KG/M2 | DIASTOLIC BLOOD PRESSURE: 80 MMHG

## 2024-11-11 DIAGNOSIS — Z01.419 ENCOUNTER FOR GYNECOLOGICAL EXAMINATION (GENERAL) (ROUTINE) W/OUT ABNORMAL FINDINGS: ICD-10-CM

## 2024-11-11 DIAGNOSIS — R92.30 DENSE BREASTS, UNSPECIFIED: ICD-10-CM

## 2024-11-11 DIAGNOSIS — Z12.31 ENCOUNTER FOR SCREENING MAMMOGRAM FOR MALIGNANT NEOPLASM OF BREAST: ICD-10-CM

## 2024-11-11 DIAGNOSIS — M81.0 AGE-RELATED OSTEOPOROSIS W/OUT CURRENT PATHOLOGICAL FRACTURE: ICD-10-CM

## 2024-11-11 PROCEDURE — G0101: CPT

## 2025-03-15 ENCOUNTER — RX RENEWAL (OUTPATIENT)
Age: 70
End: 2025-03-15

## 2025-03-21 ENCOUNTER — LABORATORY RESULT (OUTPATIENT)
Age: 70
End: 2025-03-21

## 2025-03-21 ENCOUNTER — APPOINTMENT (OUTPATIENT)
Dept: INTERNAL MEDICINE | Facility: CLINIC | Age: 70
End: 2025-03-21
Payer: MEDICARE

## 2025-03-21 VITALS
WEIGHT: 99 LBS | HEART RATE: 87 BPM | SYSTOLIC BLOOD PRESSURE: 140 MMHG | DIASTOLIC BLOOD PRESSURE: 90 MMHG | BODY MASS INDEX: 16.47 KG/M2 | OXYGEN SATURATION: 98 %

## 2025-03-21 VITALS — TEMPERATURE: 97.5 F

## 2025-03-21 DIAGNOSIS — M25.539 PAIN IN UNSPECIFIED WRIST: ICD-10-CM

## 2025-03-21 DIAGNOSIS — Z86.19 PERSONAL HISTORY OF OTHER INFECTIOUS AND PARASITIC DISEASES: ICD-10-CM

## 2025-03-21 PROCEDURE — G2211 COMPLEX E/M VISIT ADD ON: CPT

## 2025-03-21 PROCEDURE — 36415 COLL VENOUS BLD VENIPUNCTURE: CPT

## 2025-03-21 PROCEDURE — 99213 OFFICE O/P EST LOW 20 MIN: CPT

## 2025-03-24 DIAGNOSIS — A69.20 LYME DISEASE, UNSPECIFIED: ICD-10-CM

## 2025-03-24 LAB
B BURGDOR AB SER-IMP: POSITIVE
B BURGDOR IGG+IGM SER QL: 1.34 INDEX

## 2025-04-01 RX ORDER — AMOXICILLIN 500 MG/1
500 TABLET, FILM COATED ORAL
Qty: 42 | Refills: 0 | Status: ACTIVE | COMMUNITY
Start: 2025-04-01 | End: 1900-01-01

## 2025-04-01 RX ORDER — DOXYCYCLINE HYCLATE 100 MG/1
100 TABLET ORAL TWICE DAILY
Qty: 56 | Refills: 0 | Status: DISCONTINUED | COMMUNITY
Start: 2025-03-24 | End: 2025-04-01

## 2025-04-03 ENCOUNTER — RESULT REVIEW (OUTPATIENT)
Age: 70
End: 2025-04-03